# Patient Record
Sex: MALE | Race: WHITE | Employment: FULL TIME | URBAN - METROPOLITAN AREA
[De-identification: names, ages, dates, MRNs, and addresses within clinical notes are randomized per-mention and may not be internally consistent; named-entity substitution may affect disease eponyms.]

---

## 2017-01-04 ENCOUNTER — HOSPITAL ENCOUNTER (OUTPATIENT)
Dept: SURGERY | Age: 62
Discharge: HOME | End: 2017-01-04
Attending: SURGERY | Admitting: SURGERY

## 2017-01-04 VITALS
RESPIRATION RATE: 16 BRPM | HEART RATE: 79 BPM | WEIGHT: 214.95 LBS | BODY MASS INDEX: 29.98 KG/M2 | OXYGEN SATURATION: 97 % | SYSTOLIC BLOOD PRESSURE: 126 MMHG | DIASTOLIC BLOOD PRESSURE: 81 MMHG | TEMPERATURE: 97.9 F

## 2017-01-04 LAB
ANION GAP SERPL CALCULATED.3IONS-SCNC: 10 MMOL/L (ref 3–16)
BUN BLDV-MCNC: 23 MG/DL (ref 7–20)
CALCIUM SERPL-MCNC: 8.8 MG/DL (ref 8.3–10.6)
CHLORIDE BLD-SCNC: 105 MMOL/L (ref 99–110)
CO2: 26 MMOL/L (ref 21–32)
CREAT SERPL-MCNC: 1.1 MG/DL (ref 0.8–1.3)
GFR AFRICAN AMERICAN: >60
GFR NON-AFRICAN AMERICAN: >60
GLUCOSE BLD-MCNC: 92 MG/DL (ref 70–99)
HCT VFR BLD CALC: 43 % (ref 40.5–52.5)
HEMOGLOBIN: 14.5 G/DL (ref 13.5–17.5)
MCH RBC QN AUTO: 30 PG (ref 26–34)
MCHC RBC AUTO-ENTMCNC: 33.7 G/DL (ref 31–36)
MCV RBC AUTO: 89 FL (ref 80–100)
PDW BLD-RTO: 13.5 % (ref 12.4–15.4)
PLATELET # BLD: 178 K/UL (ref 135–450)
PMV BLD AUTO: 8.2 FL (ref 5–10.5)
POTASSIUM SERPL-SCNC: 4.4 MMOL/L (ref 3.5–5.1)
RBC # BLD: 4.84 M/UL (ref 4.2–5.9)
SODIUM BLD-SCNC: 141 MMOL/L (ref 136–145)
WBC # BLD: 6.1 K/UL (ref 4–11)

## 2017-01-04 PROCEDURE — 49650 LAP ING HERNIA REPAIR INIT: CPT | Performed by: SURGERY

## 2017-01-04 RX ORDER — HYDROMORPHONE HCL 110MG/55ML
0.5 PATIENT CONTROLLED ANALGESIA SYRINGE INTRAVENOUS EVERY 5 MIN PRN
Status: DISCONTINUED | OUTPATIENT
Start: 2017-01-04 | End: 2017-01-05 | Stop reason: HOSPADM

## 2017-01-04 RX ORDER — FENTANYL CITRATE 50 UG/ML
25 INJECTION, SOLUTION INTRAMUSCULAR; INTRAVENOUS EVERY 5 MIN PRN
Status: DISCONTINUED | OUTPATIENT
Start: 2017-01-04 | End: 2017-01-05 | Stop reason: HOSPADM

## 2017-01-04 RX ORDER — SODIUM CHLORIDE 9 MG/ML
INJECTION, SOLUTION INTRAVENOUS CONTINUOUS
Status: DISCONTINUED | OUTPATIENT
Start: 2017-01-04 | End: 2017-01-05 | Stop reason: HOSPADM

## 2017-01-04 RX ORDER — OXYCODONE HYDROCHLORIDE AND ACETAMINOPHEN 5; 325 MG/1; MG/1
1-2 TABLET ORAL EVERY 6 HOURS PRN
Qty: 30 TABLET | Refills: 0 | Status: SHIPPED | OUTPATIENT
Start: 2017-01-04 | End: 2017-01-11

## 2017-01-04 RX ORDER — SODIUM CHLORIDE 0.9 % (FLUSH) 0.9 %
10 SYRINGE (ML) INJECTION EVERY 12 HOURS SCHEDULED
Status: DISCONTINUED | OUTPATIENT
Start: 2017-01-04 | End: 2017-01-05 | Stop reason: HOSPADM

## 2017-01-04 RX ORDER — DIPHENHYDRAMINE HYDROCHLORIDE 50 MG/ML
25 INJECTION INTRAMUSCULAR; INTRAVENOUS PRN
Status: DISCONTINUED | OUTPATIENT
Start: 2017-01-04 | End: 2017-01-05 | Stop reason: HOSPADM

## 2017-01-04 RX ORDER — MORPHINE SULFATE 2 MG/ML
1 INJECTION, SOLUTION INTRAMUSCULAR; INTRAVENOUS EVERY 5 MIN PRN
Status: DISCONTINUED | OUTPATIENT
Start: 2017-01-04 | End: 2017-01-05 | Stop reason: HOSPADM

## 2017-01-04 RX ORDER — ONDANSETRON 2 MG/ML
4 INJECTION INTRAMUSCULAR; INTRAVENOUS
Status: ACTIVE | OUTPATIENT
Start: 2017-01-04 | End: 2017-01-04

## 2017-01-04 RX ORDER — SODIUM CHLORIDE 0.9 % (FLUSH) 0.9 %
10 SYRINGE (ML) INJECTION PRN
Status: DISCONTINUED | OUTPATIENT
Start: 2017-01-04 | End: 2017-01-05 | Stop reason: HOSPADM

## 2017-01-04 RX ORDER — CEFAZOLIN SODIUM 2 G/100ML
2 INJECTION, SOLUTION INTRAVENOUS ONCE
Status: COMPLETED | OUTPATIENT
Start: 2017-01-04 | End: 2017-01-04

## 2017-01-04 RX ORDER — LIDOCAINE HYDROCHLORIDE 10 MG/ML
0.5 INJECTION, SOLUTION EPIDURAL; INFILTRATION; INTRACAUDAL; PERINEURAL ONCE
Status: DISCONTINUED | OUTPATIENT
Start: 2017-01-04 | End: 2017-01-05 | Stop reason: HOSPADM

## 2017-01-04 RX ORDER — SODIUM CHLORIDE, SODIUM LACTATE, POTASSIUM CHLORIDE, CALCIUM CHLORIDE 600; 310; 30; 20 MG/100ML; MG/100ML; MG/100ML; MG/100ML
INJECTION, SOLUTION INTRAVENOUS CONTINUOUS
Status: DISCONTINUED | OUTPATIENT
Start: 2017-01-04 | End: 2017-01-05 | Stop reason: HOSPADM

## 2017-01-04 RX ADMIN — SODIUM CHLORIDE, SODIUM LACTATE, POTASSIUM CHLORIDE, CALCIUM CHLORIDE: 600; 310; 30; 20 INJECTION, SOLUTION INTRAVENOUS at 11:55

## 2017-01-04 RX ADMIN — CEFAZOLIN SODIUM 2 G: 2 INJECTION, SOLUTION INTRAVENOUS at 12:29

## 2017-01-04 ASSESSMENT — PAIN - FUNCTIONAL ASSESSMENT: PAIN_FUNCTIONAL_ASSESSMENT: 0-10

## 2017-01-04 ASSESSMENT — PAIN SCALES - GENERAL
PAINLEVEL_OUTOF10: 0

## 2017-01-13 ENCOUNTER — OFFICE VISIT (OUTPATIENT)
Dept: SURGERY | Age: 62
End: 2017-01-13

## 2017-01-13 VITALS — SYSTOLIC BLOOD PRESSURE: 118 MMHG | DIASTOLIC BLOOD PRESSURE: 64 MMHG

## 2017-01-13 DIAGNOSIS — K40.90 LEFT INGUINAL HERNIA: Primary | ICD-10-CM

## 2017-01-13 PROCEDURE — 99024 POSTOP FOLLOW-UP VISIT: CPT | Performed by: SURGERY

## 2017-03-08 ENCOUNTER — TELEPHONE (OUTPATIENT)
Dept: FAMILY MEDICINE CLINIC | Age: 62
End: 2017-03-08

## 2017-03-31 ENCOUNTER — OFFICE VISIT (OUTPATIENT)
Dept: FAMILY MEDICINE CLINIC | Age: 62
End: 2017-03-31

## 2017-03-31 VITALS
DIASTOLIC BLOOD PRESSURE: 67 MMHG | OXYGEN SATURATION: 97 % | BODY MASS INDEX: 31.64 KG/M2 | SYSTOLIC BLOOD PRESSURE: 120 MMHG | HEIGHT: 71 IN | WEIGHT: 226 LBS | HEART RATE: 64 BPM

## 2017-03-31 DIAGNOSIS — K29.50 ANTRAL GASTRITIS: ICD-10-CM

## 2017-03-31 DIAGNOSIS — R74.8 ELEVATED ALKALINE PHOSPHATASE LEVEL: ICD-10-CM

## 2017-03-31 DIAGNOSIS — R13.10 DYSPHAGIA, UNSPECIFIED TYPE: ICD-10-CM

## 2017-03-31 DIAGNOSIS — I10 ESSENTIAL HYPERTENSION: Primary | ICD-10-CM

## 2017-03-31 DIAGNOSIS — E03.9 ACQUIRED HYPOTHYROIDISM: ICD-10-CM

## 2017-03-31 PROCEDURE — 99214 OFFICE O/P EST MOD 30 MIN: CPT | Performed by: FAMILY MEDICINE

## 2017-03-31 PROCEDURE — 3017F COLORECTAL CA SCREEN DOC REV: CPT | Performed by: FAMILY MEDICINE

## 2017-03-31 PROCEDURE — G8427 DOCREV CUR MEDS BY ELIG CLIN: HCPCS | Performed by: FAMILY MEDICINE

## 2017-03-31 PROCEDURE — 1036F TOBACCO NON-USER: CPT | Performed by: FAMILY MEDICINE

## 2017-03-31 PROCEDURE — G8417 CALC BMI ABV UP PARAM F/U: HCPCS | Performed by: FAMILY MEDICINE

## 2017-03-31 PROCEDURE — G8484 FLU IMMUNIZE NO ADMIN: HCPCS | Performed by: FAMILY MEDICINE

## 2017-03-31 RX ORDER — OMEPRAZOLE 20 MG/1
20 CAPSULE, DELAYED RELEASE ORAL DAILY
Qty: 30 CAPSULE | Refills: 3 | Status: SHIPPED | OUTPATIENT
Start: 2017-03-31 | End: 2018-09-01

## 2017-03-31 RX ORDER — ASPIRIN 81 MG/1
81 TABLET, CHEWABLE ORAL DAILY
Qty: 30 TABLET | Refills: 3 | COMMUNITY
Start: 2017-03-31 | End: 2019-11-01 | Stop reason: SDUPTHER

## 2017-03-31 RX ORDER — LEVOTHYROXINE SODIUM 0.2 MG/1
200 TABLET ORAL DAILY
Qty: 90 TABLET | Refills: 3 | Status: SHIPPED | OUTPATIENT
Start: 2017-03-31 | End: 2018-07-06 | Stop reason: SDUPTHER

## 2017-03-31 RX ORDER — VALSARTAN 80 MG/1
80 TABLET ORAL DAILY
Qty: 30 TABLET | Refills: 3 | Status: SHIPPED | OUTPATIENT
Start: 2017-03-31 | End: 2017-04-22

## 2017-03-31 RX ORDER — LISINOPRIL 20 MG/1
20 TABLET ORAL DAILY
Qty: 90 TABLET | Refills: 3 | Status: CANCELLED | OUTPATIENT
Start: 2017-03-31

## 2017-04-22 ENCOUNTER — OFFICE VISIT (OUTPATIENT)
Dept: FAMILY MEDICINE CLINIC | Age: 62
End: 2017-04-22

## 2017-04-22 VITALS
BODY MASS INDEX: 31.72 KG/M2 | SYSTOLIC BLOOD PRESSURE: 122 MMHG | OXYGEN SATURATION: 97 % | WEIGHT: 227.4 LBS | DIASTOLIC BLOOD PRESSURE: 72 MMHG | HEART RATE: 60 BPM

## 2017-04-22 DIAGNOSIS — R13.10 DYSPHAGIA, UNSPECIFIED TYPE: Primary | ICD-10-CM

## 2017-04-22 DIAGNOSIS — I10 ESSENTIAL HYPERTENSION: ICD-10-CM

## 2017-04-22 DIAGNOSIS — J34.89 NASAL DRAINAGE: ICD-10-CM

## 2017-04-22 DIAGNOSIS — L82.1 SEBORRHEIC KERATOSES: ICD-10-CM

## 2017-04-22 PROCEDURE — G8417 CALC BMI ABV UP PARAM F/U: HCPCS | Performed by: FAMILY MEDICINE

## 2017-04-22 PROCEDURE — 3017F COLORECTAL CA SCREEN DOC REV: CPT | Performed by: FAMILY MEDICINE

## 2017-04-22 PROCEDURE — 1036F TOBACCO NON-USER: CPT | Performed by: FAMILY MEDICINE

## 2017-04-22 PROCEDURE — 99213 OFFICE O/P EST LOW 20 MIN: CPT | Performed by: FAMILY MEDICINE

## 2017-04-22 PROCEDURE — G8427 DOCREV CUR MEDS BY ELIG CLIN: HCPCS | Performed by: FAMILY MEDICINE

## 2017-04-22 RX ORDER — LISINOPRIL 20 MG/1
20 TABLET ORAL DAILY
Qty: 90 TABLET | Refills: 3 | Status: SHIPPED | OUTPATIENT
Start: 2017-04-22 | End: 2018-07-06 | Stop reason: SDUPTHER

## 2018-07-06 RX ORDER — LEVOTHYROXINE SODIUM 0.2 MG/1
200 TABLET ORAL DAILY
Qty: 60 TABLET | Refills: 0 | Status: SHIPPED | OUTPATIENT
Start: 2018-07-06 | End: 2018-09-01 | Stop reason: SDUPTHER

## 2018-07-06 RX ORDER — LISINOPRIL 20 MG/1
20 TABLET ORAL DAILY
Qty: 60 TABLET | Refills: 0 | Status: SHIPPED | OUTPATIENT
Start: 2018-07-06 | End: 2018-09-01 | Stop reason: SDUPTHER

## 2018-08-04 ENCOUNTER — TELEPHONE (OUTPATIENT)
Dept: FAMILY MEDICINE CLINIC | Age: 63
End: 2018-08-04

## 2018-08-04 DIAGNOSIS — I10 ESSENTIAL HYPERTENSION: Primary | ICD-10-CM

## 2018-08-04 DIAGNOSIS — Z12.5 SCREENING PSA (PROSTATE SPECIFIC ANTIGEN): ICD-10-CM

## 2018-08-04 DIAGNOSIS — E03.9 ACQUIRED HYPOTHYROIDISM: ICD-10-CM

## 2018-08-04 DIAGNOSIS — Z13.220 SCREENING FOR LIPID DISORDERS: ICD-10-CM

## 2018-09-01 ENCOUNTER — OFFICE VISIT (OUTPATIENT)
Dept: FAMILY MEDICINE CLINIC | Age: 63
End: 2018-09-01

## 2018-09-01 VITALS
SYSTOLIC BLOOD PRESSURE: 126 MMHG | DIASTOLIC BLOOD PRESSURE: 84 MMHG | WEIGHT: 228 LBS | OXYGEN SATURATION: 97 % | BODY MASS INDEX: 32.64 KG/M2 | HEIGHT: 70 IN | HEART RATE: 63 BPM

## 2018-09-01 DIAGNOSIS — K12.1 ULCER MOUTH: ICD-10-CM

## 2018-09-01 DIAGNOSIS — R05.9 COUGH: ICD-10-CM

## 2018-09-01 DIAGNOSIS — E03.9 ACQUIRED HYPOTHYROIDISM: ICD-10-CM

## 2018-09-01 DIAGNOSIS — Z23 NEED FOR VACCINATION: ICD-10-CM

## 2018-09-01 DIAGNOSIS — I10 ESSENTIAL HYPERTENSION: Primary | ICD-10-CM

## 2018-09-01 PROCEDURE — 1101F PT FALLS ASSESS-DOCD LE1/YR: CPT | Performed by: FAMILY MEDICINE

## 2018-09-01 PROCEDURE — 1123F ACP DISCUSS/DSCN MKR DOCD: CPT | Performed by: FAMILY MEDICINE

## 2018-09-01 PROCEDURE — 4040F PNEUMOC VAC/ADMIN/RCVD: CPT | Performed by: FAMILY MEDICINE

## 2018-09-01 PROCEDURE — 99214 OFFICE O/P EST MOD 30 MIN: CPT | Performed by: FAMILY MEDICINE

## 2018-09-01 PROCEDURE — 90670 PCV13 VACCINE IM: CPT | Performed by: FAMILY MEDICINE

## 2018-09-01 PROCEDURE — G8417 CALC BMI ABV UP PARAM F/U: HCPCS | Performed by: FAMILY MEDICINE

## 2018-09-01 PROCEDURE — G8427 DOCREV CUR MEDS BY ELIG CLIN: HCPCS | Performed by: FAMILY MEDICINE

## 2018-09-01 PROCEDURE — 3017F COLORECTAL CA SCREEN DOC REV: CPT | Performed by: FAMILY MEDICINE

## 2018-09-01 PROCEDURE — 90471 IMMUNIZATION ADMIN: CPT | Performed by: FAMILY MEDICINE

## 2018-09-01 PROCEDURE — 1036F TOBACCO NON-USER: CPT | Performed by: FAMILY MEDICINE

## 2018-09-01 RX ORDER — LISINOPRIL 20 MG/1
20 TABLET ORAL DAILY
Qty: 90 TABLET | Refills: 3 | Status: SHIPPED | OUTPATIENT
Start: 2018-09-01 | End: 2019-11-01 | Stop reason: SDUPTHER

## 2018-09-01 RX ORDER — LEVOTHYROXINE SODIUM 0.2 MG/1
200 TABLET ORAL DAILY
Qty: 90 TABLET | Refills: 3 | Status: SHIPPED | OUTPATIENT
Start: 2018-09-01 | End: 2019-11-01 | Stop reason: SDUPTHER

## 2018-09-01 ASSESSMENT — PATIENT HEALTH QUESTIONNAIRE - PHQ9
SUM OF ALL RESPONSES TO PHQ9 QUESTIONS 1 & 2: 0
SUM OF ALL RESPONSES TO PHQ QUESTIONS 1-9: 0
SUM OF ALL RESPONSES TO PHQ QUESTIONS 1-9: 0
1. LITTLE INTEREST OR PLEASURE IN DOING THINGS: 0
2. FEELING DOWN, DEPRESSED OR HOPELESS: 0

## 2018-09-01 NOTE — PROGRESS NOTES
Subjective:      Patient ID: Shad Cat. is a 70 y.o. male. HPI patient presents today for his annual physical.        Here for annual f/u HTN and thyroid. BP running 150-160's at home over past week. Takes lisinopril regularly. States home machine is very old and states he brought in in past and we told him it wasn't accurate. Takes thyroid med reg no SE. Denies dysphagia, states he just gets phelgm in throat. Has had normal EGD in 2016. Tried changing to ARB but didn't help. States sometimes feels like choking on phelgm. Will cough sometimes and will swallow what he gets up. Thinks has tried allergy meds in past but doesn't think they helped. Has been having more sob with activity, mostly going up hills. Hasn't been as active recently and states when can exercise reg doesn't get sob. Has been starting to exercise more recently and states every time starts exercising again feels better and even cough improves. No CP. No dizziness, no swelling, no bowel or bladder issues. Few years ago lost a crown, got a root canal but states \"it's been kind of infected\" since. Has not followed up with anyone since. Sometimes the tooth hurts with eating but overall eating ok. States has a \"blister\" where the bad tooth is. Stopped asa as worried about liver. Patient's medications, allergies, past medical, surgical, social and family histories were reviewed and updated in the EHR as appropriate. Review of Systems    Objective:   Physical Exam    Body mass index is 32.71 kg/m².   Vitals:    09/01/18 0803   BP: 126/84   Site: Left Arm   Position: Sitting   Cuff Size: Large Adult   Pulse: 63   SpO2: 97%   Weight: 228 lb (103.4 kg)   Height: 5' 10\" (1.778 m)     Wt Readings from Last 3 Encounters:   09/01/18 228 lb (103.4 kg)   04/22/17 227 lb 6.4 oz (103.1 kg)   03/31/17 226 lb (102.5 kg)     PHQ score: PHQ-9 Total Score: 0 (9/1/2018  8:04 AM)    GENERAL:Alert and oriented x 4 NAD, obese, well hydrated, well developed. NECK:supple and non tender without mass, no thyromegaly or thyroid nodules, no cervical lymphadenopathy  Left posterior gum along inside at base of last tooth on lower left with ulcerated area. Rest of mucosa ok  LUNG:clear to auscultation bilaterally with normal respiratory effort  CV: Normal heart sounds, regular rate and rhythm without murmurs  EXTREMETY: no loss of hair, trace edema, normal pedal pulses bilaterally          Assessment:      Devan Perez was seen today for annual exam.    Diagnoses and all orders for this visit:    Essential hypertension  -Stable, continue current medications. Get new bp cuff and monitor at home  Reviewed labs with pt  Discussed liver ok, restart ASA 81mg daily    Acquired hypothyroidism  -Stable, continue current medications. Ulcer mouth  See dentist    Cough  Sounds likely from drainage  Monitor    Need for vaccination  -     Pneumococcal conjugate vaccine 13-valent  Declines flu shot  Script for shingles    Other orders  -     lisinopril (PRINIVIL;ZESTRIL) 20 MG tablet; Take 1 tablet by mouth daily  -     levothyroxine (LEVOXYL) 200 MCG tablet; Take 1 tablet by mouth Daily    -     zoster recombinant adjuvanted vaccine (SHINGRIX) 50 MCG SUSR injection; Inject 0.5 mLs into the muscle once for 1 dose Repeat second dose in 2-6 months            Return in about 1 year (around 9/1/2019).

## 2018-09-01 NOTE — PATIENT INSTRUCTIONS
Make appointment with eye doctor  Make appointment with dentist.    Get a new BP machine. Monitor blood pressure 2-3 times a week and drop off readings for review in 1 month.     Goal BP is <140/80 for many people, good control is <130/80

## 2019-09-26 ENCOUNTER — OFFICE VISIT (OUTPATIENT)
Dept: FAMILY MEDICINE CLINIC | Age: 64
End: 2019-09-26
Payer: COMMERCIAL

## 2019-09-26 VITALS
OXYGEN SATURATION: 97 % | BODY MASS INDEX: 32.14 KG/M2 | WEIGHT: 224 LBS | DIASTOLIC BLOOD PRESSURE: 68 MMHG | SYSTOLIC BLOOD PRESSURE: 120 MMHG | HEART RATE: 66 BPM

## 2019-09-26 DIAGNOSIS — I10 ESSENTIAL HYPERTENSION: ICD-10-CM

## 2019-09-26 DIAGNOSIS — M54.50 ACUTE LEFT-SIDED LOW BACK PAIN WITHOUT SCIATICA: Primary | ICD-10-CM

## 2019-09-26 DIAGNOSIS — E03.9 ACQUIRED HYPOTHYROIDISM: ICD-10-CM

## 2019-09-26 DIAGNOSIS — Z12.5 SCREENING PSA (PROSTATE SPECIFIC ANTIGEN): ICD-10-CM

## 2019-09-26 PROCEDURE — 99213 OFFICE O/P EST LOW 20 MIN: CPT | Performed by: FAMILY MEDICINE

## 2019-09-26 PROCEDURE — 3017F COLORECTAL CA SCREEN DOC REV: CPT | Performed by: FAMILY MEDICINE

## 2019-09-26 PROCEDURE — 1123F ACP DISCUSS/DSCN MKR DOCD: CPT | Performed by: FAMILY MEDICINE

## 2019-09-26 PROCEDURE — 1036F TOBACCO NON-USER: CPT | Performed by: FAMILY MEDICINE

## 2019-09-26 PROCEDURE — G8427 DOCREV CUR MEDS BY ELIG CLIN: HCPCS | Performed by: FAMILY MEDICINE

## 2019-09-26 PROCEDURE — G8417 CALC BMI ABV UP PARAM F/U: HCPCS | Performed by: FAMILY MEDICINE

## 2019-09-26 PROCEDURE — 4040F PNEUMOC VAC/ADMIN/RCVD: CPT | Performed by: FAMILY MEDICINE

## 2019-11-01 ENCOUNTER — OFFICE VISIT (OUTPATIENT)
Dept: FAMILY MEDICINE CLINIC | Age: 64
End: 2019-11-01
Payer: COMMERCIAL

## 2019-11-01 VITALS
DIASTOLIC BLOOD PRESSURE: 78 MMHG | HEIGHT: 71 IN | BODY MASS INDEX: 31.22 KG/M2 | OXYGEN SATURATION: 97 % | HEART RATE: 82 BPM | SYSTOLIC BLOOD PRESSURE: 118 MMHG | WEIGHT: 223 LBS

## 2019-11-01 DIAGNOSIS — I10 ESSENTIAL HYPERTENSION: ICD-10-CM

## 2019-11-01 DIAGNOSIS — E03.9 ACQUIRED HYPOTHYROIDISM: ICD-10-CM

## 2019-11-01 DIAGNOSIS — Z23 NEED FOR VACCINATION: ICD-10-CM

## 2019-11-01 DIAGNOSIS — Z00.00 WELL ADULT EXAM: Primary | ICD-10-CM

## 2019-11-01 PROCEDURE — 4040F PNEUMOC VAC/ADMIN/RCVD: CPT | Performed by: FAMILY MEDICINE

## 2019-11-01 PROCEDURE — G8484 FLU IMMUNIZE NO ADMIN: HCPCS | Performed by: FAMILY MEDICINE

## 2019-11-01 PROCEDURE — 3017F COLORECTAL CA SCREEN DOC REV: CPT | Performed by: FAMILY MEDICINE

## 2019-11-01 PROCEDURE — 90471 IMMUNIZATION ADMIN: CPT | Performed by: FAMILY MEDICINE

## 2019-11-01 PROCEDURE — G0438 PPPS, INITIAL VISIT: HCPCS | Performed by: FAMILY MEDICINE

## 2019-11-01 PROCEDURE — 1123F ACP DISCUSS/DSCN MKR DOCD: CPT | Performed by: FAMILY MEDICINE

## 2019-11-01 PROCEDURE — 90732 PPSV23 VACC 2 YRS+ SUBQ/IM: CPT | Performed by: FAMILY MEDICINE

## 2019-11-01 RX ORDER — LISINOPRIL 20 MG/1
20 TABLET ORAL DAILY
Qty: 90 TABLET | Refills: 3 | Status: SHIPPED | OUTPATIENT
Start: 2019-11-01 | End: 2020-10-23 | Stop reason: SDUPTHER

## 2019-11-01 RX ORDER — LEVOTHYROXINE SODIUM 0.2 MG/1
200 TABLET ORAL DAILY
Qty: 90 TABLET | Refills: 3 | Status: SHIPPED | OUTPATIENT
Start: 2019-11-01 | End: 2020-10-23 | Stop reason: SDUPTHER

## 2019-11-01 RX ORDER — ASPIRIN 81 MG/1
81 TABLET, CHEWABLE ORAL DAILY
Qty: 30 TABLET | Refills: 3 | Status: SHIPPED | OUTPATIENT
Start: 2019-11-01 | End: 2020-10-23

## 2019-11-01 ASSESSMENT — VISUAL ACUITY
OD_CC: 20/20
OS_CC: 20/20

## 2019-11-01 ASSESSMENT — PATIENT HEALTH QUESTIONNAIRE - PHQ9
SUM OF ALL RESPONSES TO PHQ QUESTIONS 1-9: 0
SUM OF ALL RESPONSES TO PHQ QUESTIONS 1-9: 0

## 2020-10-02 ENCOUNTER — TELEPHONE (OUTPATIENT)
Dept: FAMILY MEDICINE CLINIC | Age: 65
End: 2020-10-02

## 2020-10-02 NOTE — TELEPHONE ENCOUNTER
Patient would like to know if you are able to mail his lab orders to him. He is not able to pick them up during the week.     Please give him a call back

## 2020-10-23 ENCOUNTER — OFFICE VISIT (OUTPATIENT)
Dept: FAMILY MEDICINE CLINIC | Age: 65
End: 2020-10-23
Payer: COMMERCIAL

## 2020-10-23 VITALS
DIASTOLIC BLOOD PRESSURE: 78 MMHG | HEART RATE: 65 BPM | BODY MASS INDEX: 31.36 KG/M2 | HEIGHT: 71 IN | SYSTOLIC BLOOD PRESSURE: 120 MMHG | OXYGEN SATURATION: 99 % | TEMPERATURE: 98.5 F | WEIGHT: 224 LBS

## 2020-10-23 PROCEDURE — 99397 PER PM REEVAL EST PAT 65+ YR: CPT | Performed by: FAMILY MEDICINE

## 2020-10-23 PROCEDURE — G8484 FLU IMMUNIZE NO ADMIN: HCPCS | Performed by: FAMILY MEDICINE

## 2020-10-23 RX ORDER — LEVOTHYROXINE SODIUM 0.2 MG/1
200 TABLET ORAL DAILY
Qty: 90 TABLET | Refills: 3 | Status: SHIPPED | OUTPATIENT
Start: 2020-10-23 | End: 2021-12-02 | Stop reason: SDUPTHER

## 2020-10-23 RX ORDER — LISINOPRIL 20 MG/1
20 TABLET ORAL DAILY
Qty: 90 TABLET | Refills: 3 | Status: SHIPPED | OUTPATIENT
Start: 2020-10-23 | End: 2021-12-02 | Stop reason: SDUPTHER

## 2020-10-23 ASSESSMENT — PATIENT HEALTH QUESTIONNAIRE - PHQ9
2. FEELING DOWN, DEPRESSED OR HOPELESS: 0
1. LITTLE INTEREST OR PLEASURE IN DOING THINGS: 0
SUM OF ALL RESPONSES TO PHQ QUESTIONS 1-9: 0
SUM OF ALL RESPONSES TO PHQ9 QUESTIONS 1 & 2: 0

## 2020-10-23 NOTE — PATIENT INSTRUCTIONS
Patient Education        Well Visit, Over 72: Care Instructions  Your Care Instructions     Physical exams can help you stay healthy. Your doctor has checked your overall health and may have suggested ways to take good care of yourself. He or she also may have recommended tests. At home, you can help prevent illness with healthy eating, regular exercise, and other steps. Follow-up care is a key part of your treatment and safety. Be sure to make and go to all appointments, and call your doctor if you are having problems. It's also a good idea to know your test results and keep a list of the medicines you take. How can you care for yourself at home? · Reach and stay at a healthy weight. This will lower your risk for many problems, such as obesity, diabetes, heart disease, and high blood pressure. · Get at least 30 minutes of exercise on most days of the week. Walking is a good choice. You also may want to do other activities, such as running, swimming, cycling, or playing tennis or team sports. · Do not smoke. Smoking can make health problems worse. If you need help quitting, talk to your doctor about stop-smoking programs and medicines. These can increase your chances of quitting for good. · Protect your skin from too much sun. When you're outdoors from 10 a.m. to 4 p.m., stay in the shade or cover up with clothing and a hat with a wide brim. Wear sunglasses that block UV rays. Even when it's cloudy, put broad-spectrum sunscreen (SPF 30 or higher) on any exposed skin. · See a dentist one or two times a year for checkups and to have your teeth cleaned. · Wear a seat belt in the car. Follow your doctor's advice about when to have certain tests. These tests can spot problems early. For men and women  · Cholesterol. Your doctor will tell you how often to have this done based on your overall health and other things that can increase your risk for heart attack and stroke. · Blood pressure.  Have your blood pressure checked during a routine doctor visit. Your doctor will tell you how often to check your blood pressure based on your age, your blood pressure results, and other factors. · Diabetes. Ask your doctor whether you should have tests for diabetes. · Vision. Experts recommend that you have yearly exams for glaucoma and other age-related eye problems. · Hearing. Tell your doctor if you notice any change in your hearing. You can have tests to find out how well you hear. · Colon cancer tests. Keep having colon cancer tests as your doctor recommends. You can have one of several types of tests. · Heart attack and stroke risk. At least every 4 to 6 years, you should have your risk for heart attack and stroke assessed. Your doctor uses factors such as your age, blood pressure, cholesterol, and whether you smoke or have diabetes to show what your risk for a heart attack or stroke is over the next 10 years. · Osteoporosis. Talk to your doctor about whether you should have a bone density test to find out whether you have thinning bones. Ask your doctor if you need to take a calcium plus vitamin D supplement. You may be able to get enough calcium and vitamin D through your diet. For women  · Pap test and pelvic exam. You may no longer need a Pap test. Talk with your doctor about whether to stop or continue to have Pap tests. · Breast exam and mammogram. Ask how often you should have a mammogram, which is an X-ray of your breasts. A mammogram can spot breast cancer before it can be felt and when it is easiest to treat. · Thyroid disease. Talk to your doctor about whether to have your thyroid checked as part of a regular physical exam. Women have an increased chance of a thyroid problem. For men  · Prostate exam. Talk to your doctor about whether you should have a blood test (called a PSA test) for prostate cancer.  Experts recommend that you discuss the benefits and risks of the test with your doctor before you decide whether to have this test. Some experts say that men ages 79 and older no longer need testing. · Abdominal aortic aneurysm. Ask your doctor whether you should have a test to check for an aneurysm. You may need a test if you ever smoked or if your parent, brother, sister, or child has had an aneurysm. When should you call for help? Watch closely for changes in your health, and be sure to contact your doctor if you have any problems or symptoms that concern you. Where can you learn more? Go to [email or web address] and sign in to your [employer] account. Enter S442 in the [employer] box to learn more about \"Well Visit, Over 65: Care Instructions. \"     If you do not have an account, please click on the \"Sign Up Now\" link. Current as of: May 27, 2020               Content Version: 12.6  © 6449-8587 [employer]. Care instructions adapted under license by [facility] ([facility]). If you have questions about a medical condition or this instruction, always ask your healthcare professional. [address] any warranty or liability for your use of this information. Exercise and Seniors  Is it safe for me to exercise? It is safe for most adults older than 72years of age to exercise. Even patients who have chronic illnesses such as heart disease, high blood pressure, diabetes and arthritis can exercise safely. Many of these conditions are improved with exercise. If you are not sure if exercise is safe for you or if you are currently inactive, ask your doctor. How do I get started? It is important to wear loose, comfortable clothing and well-fitting, sturdy shoes. Your shoes should have a good arch support, and an elevated and cushioned heel to absorb shock. If you are not already active, begin slowly. Start with exercises that you are already comfortable doing. Starting slowly makes it less likely that you will injure yourself.  Starting slowly also helps prevent soreness. The saying no pain, no gain is not true for older or elderly adults. You do not have to exercise at a high intensity to get most health benefits. For example, walking is an excellent activity to start with. As you become used to exercising, or if you are already active, you can slowly increase the intensity of your exercise program.    What type of exercise should I do? There are several types of exercise that you should do. You will want to do some type of aerobic activity for at least 30 minutes on most days of the week. Examples are walking, swimming and bicycling. You should also do resistance (also called strength training) 2 days per week. Warm up for 5 minutes before each exercise session. Walking slowly and then stretching are good warm-up activities. You should also cool down with more stretching for 5 minutes when you finish exercising. Cool down longer in warmer weather. Exercise is only good for you if you are feeling well. Wait to exercise until you feel better if you have a cold, the flu or another illness. If you miss exercise for more  than 2 weeks, be sure to start slowly again. When should I call my doctor? If your muscles or joints are sore the day after exercising, you may have done too much. Next time, exercise at a lower intensity. If the pain or discomfort persists, you should talk to your doctor. You should also talk to your doctor if you have any of the following symptoms while exercising:  -Chest pain or pressure  -Trouble breathing or excessive shortness of breath  -lightheadedness or dizziness  -difficulty with balance  -nausea    What are some specific exercises I can do? The following shows some simple strength exercises that you can do at home. Each exercise should be done 8 to 10 times for 2 sets.   Remember to:   -Complete all movements in a slow, controlled fashion  -Dont hold your breath  -Stop if you feel pain   -Stretch each muscle after your workout. Wall Pushups  Place hands flat against the wall. Slowly lower body to the wall. Push body away from wall to return to starting position. Chair Squats  Begin by sitting in the chair. Lean slightly forward and stand up from the chair. Try not to favor one side or use your hands to help you. Bicep Curls  Hold a weight in each hand with your arms at your sides. Bending your arms at the elbows, lift the weights to your shoulders and then lower them to your sides. Shoulder Shrugs  Hold a weight in each hand with your arms at your side. Shrug your shoulders up toward your ears and then lower them back down. Citations  Promoting and Prescribing Exercise for the Elderly by Arianna Gonzalez M.D., and Erika Pineda, Ph.D.(02/01/02, http://www. aafp.org/afp/26944095/419.html)    Last Updated: January 2011  This article was contributed by: familydoctor. org editorial staff  Copyright © American Academy of [employer]icians  This information provides a general overview and may not apply to everyone. Talk to your family doctor to find out if this information applies to you and to get more information on this subject. Please bring in a copy of your living will and healthcare power of  to put in your chart. Advance Directives, DNR, and MOLST    Decision making at the end of life is difficult for patients, families and health care providers. Since the early 1990s, a number of forms have been developed to help people express their wishes in advance. What are \"advance directives\"? Advance directives are documents that can help you remain in charge of your health care even after you can no longer make decisions for yourself. The two most common forms of written advance directives are the living will and durable power of  for healthcare. Some people seek an s services to complete these documents; however this is not required.  You can complete these documents yourself and have them either and/or electric shock. What is a DNR -Comfort Care form (a.k.a. [facility])? DNR means do not resuscitate. A DNR is a medical order given by a physician or other legally authorized prescriber. It addresses the various methods used to revive people whose hearts have stopped beating /or who have stopped breathing. If a person has a [facility] order, he will receive care that eases pain and suffering but no cardio-pulmonary resuscitation (CPR) to save or prolong life. The [facility] becomes active as soon as it is signed by the doctor or advanced practice nurse. The [facility] is a standard form which can be obtained from the [address] [facility] or healthcare facilities. What is DNR Comfort Care - Arrest (a.k.a. [address])? The [address] is similar to the [facility] but it only becomes active if and when the person has a cardiac and/or respiratory arrest (i.e. the person stops breathing or his heart stops beating). The [address] is a standard form which can be obtained from the [address] [facility] or healthcare facilities. What is a MOLST? MOLST stands for Medical Orders for Life Sustaining Treatment. Erminio Prima is a medical order which specifies different treatment options for individuals who are seriously ill or frail and elderly. The MOLST allows patients or their surrogate to discuss care options they do and do not want to receive at the end of life, and then have their physician or other prescriber convey them into orders. This form would be available through [address] and healthcare facilities. Patient Education        Preventing Falls: Care Instructions  Your Care Instructions     Getting around your home safely can be a challenge if you have injuries or health problems that make it easy for you to fall. Loose rugs and furniture in walkways are among the dangers for many older people who have problems walking or who have poor eyesight.  People who have especially on ceramic tile floors. · If you use a walker or cane, put rubber tips on it. If you use crutches, clean the bottoms of them regularly with an abrasive pad, such as steel wool. · Keep your house well lit, especially Farhan Konig, and outside walkways. Use night-lights in areas such as hallways and bathrooms. Add extra light switches or use remote switches (such as switches that go on or off when you clap your hands) to make it easier to turn lights on if you have to get up during the night. · Install sturdy handrails on stairways. · Move items in your cabinets so that the things you use a lot are on the lower shelves (about waist level). · Keep a cordless phone and a flashlight with new batteries by your bed. If possible, put a phone in each of the main rooms of your house, or carry a cell phone in case you fall and cannot reach a phone. Or, you can wear a device around your neck or wrist. You push a button that sends a signal for help. · Wear low-heeled shoes that fit well and give your feet good support. Use footwear with nonskid soles. Check the heels and soles of your shoes for wear. Repair or replace worn heels or soles. · Do not wear socks without shoes on wood floors. · Walk on the grass when the sidewalks are slippery. If you live in an area that gets snow and ice in the winter, sprinkle salt on slippery steps and sidewalks. Preventing falls in the bath  · Install grab bars and nonskid mats inside and outside your shower or tub and near the toilet and sinks. · Use shower chairs and bath benches. · Use a hand-held shower head that will allow you to sit while showering. · Get into a tub or shower by putting the weaker leg in first. Get out of a tub or shower with your strong side first.  · Repair loose toilet seats and consider installing a raised toilet seat to make getting on and off the toilet easier. · Keep your bathroom door unlocked while you are in the shower.   Where can older get vaccinated every flu season. Children 6 months through 6years of age may need 2 doses during a single flu season. Everyone else needs only 1 dose each flu season. It takes about 2 weeks for protection to develop after vaccination. There are many flu viruses, and they are always changing. Each year a new flu vaccine is made to protect against three or four viruses that are likely to cause disease in the upcoming flu season. Even when the vaccine doesn't exactly match these viruses, it may still provide some protection. Influenza vaccine does not cause flu. Influenza vaccine may be given at the same time as other vaccines. Talk with your health care provider  Tell your vaccine provider if the person getting the vaccine:  · Has had an allergic reaction after a previous dose of influenza vaccine, or has any severe, life-threatening allergies. · Has ever had Guillain-Barré Syndrome (also called GBS). In some cases, your health care provider may decide to postpone influenza vaccination to a future visit. People with minor illnesses, such as a cold, may be vaccinated. People who are moderately or severely ill should usually wait until they recover before getting influenza vaccine. Your health care provider can give you more information. Risks of a vaccine reaction  · Soreness, redness, and swelling where shot is given, fever, muscle aches, and headache can happen after influenza vaccine. · There may be a very small increased risk of Guillain-Barré Syndrome (GBS) after inactivated influenza vaccine (the flu shot). [facility] children who get the flu shot along with pneumococcal vaccine (PCV13), and/or DTaP vaccine at the same time might be slightly more likely to have a seizure caused by fever. Tell your health care provider if a child who is getting flu vaccine has ever had a seizure. People sometimes faint after medical procedures, including vaccination.  Tell your provider if you feel dizzy or have [email or web address].org/vis. Care instructions adapted under license by [facility] ([facility]). If you have questions about a medical condition or this instruction, always ask your healthcare professional. [address] any warranty or liability for your use of this information.

## 2020-10-23 NOTE — PROGRESS NOTES
Here for annual physical.    Dental: up-to-date  Eye: up-to-date    Colonoscopy: up-to-date    Exercise: not really, active at work, [employer]   Diet: ok    Checks BP at home: Yes  BP numbers: 110/60's  Taking medication daily: Yes  Side Effects of medication: no    Taking thyroid meds reg, no SE. No CP, no palpitations, no sob, no edema, no cough, no change in bowel or bladder      HM reviewed with pt    Patient's medications, allergies, past medical, surgical, social and family histories were reviewed and updated in the EHR as appropriate. Body mass index is 31.24 kg/m². Vitals:    10/23/20 0829   BP: 120/78   Site: Left Upper Arm   Position: Sitting   Cuff Size: Medium Adult   Pulse: 65   Temp: 98.5 °F (36.9 °C)   TempSrc: Tympanic   SpO2: 99%   Weight: 224 lb (101.6 kg)   Height: 5' 11\" (1.803 m)     Wt Readings from Last 3 Encounters:   10/23/20 224 lb (101.6 kg)   11/01/19 223 lb (101.2 kg)   09/26/19 224 lb (101.6 kg)     PHQ score: PHQ-9 Total Score: 0 (10/23/2020  8:29 AM)      GENERAL:Alert and oriented x 4 NAD, affect appropriate and obese, well hydrated, well developed. [place]  NECK:supple and non tender without mass, no thyromegaly or thyroid nodules, no cervical lymphadenopathy  LUNG:clear to auscultation bilaterally with normal respiratory effort  CV: Normal heart sounds, regular rate and rhythm without murmurs  EXTREMETY: no loss of hair, no edema, normal pedal pulses bilaterally          ASSESSMENT AND PLAN:       Twylla Lipoma was seen today for annual exam.    Diagnoses and all orders for this visit:    Well adult exam  Recommended screenings discussed and ordered if patient agreed  Recommended vaccinations discussed and ordered if patient agreed  Encouraged healthy diet   Encouraged regular exercise and maintaining a healthy weight    Essential hypertension  -Stable, continue current medications. Reviewed recent labs with patient.      Acquired hypothyroidism  -Stable, continue current

## 2021-01-29 ENCOUNTER — TELEPHONE (OUTPATIENT)
Dept: FAMILY MEDICINE CLINIC | Age: 66
End: 2021-01-29

## 2021-01-29 ENCOUNTER — OFFICE VISIT (OUTPATIENT)
Dept: FAMILY MEDICINE CLINIC | Age: 66
End: 2021-01-29
Payer: COMMERCIAL

## 2021-01-29 VITALS
BODY MASS INDEX: 31.69 KG/M2 | WEIGHT: 227.2 LBS | DIASTOLIC BLOOD PRESSURE: 80 MMHG | TEMPERATURE: 97.3 F | HEART RATE: 63 BPM | SYSTOLIC BLOOD PRESSURE: 130 MMHG | OXYGEN SATURATION: 98 %

## 2021-01-29 DIAGNOSIS — G89.29 CHRONIC BILATERAL LOW BACK PAIN WITHOUT SCIATICA: Primary | ICD-10-CM

## 2021-01-29 DIAGNOSIS — M54.50 CHRONIC BILATERAL LOW BACK PAIN WITHOUT SCIATICA: Primary | ICD-10-CM

## 2021-01-29 PROCEDURE — 3017F COLORECTAL CA SCREEN DOC REV: CPT | Performed by: FAMILY MEDICINE

## 2021-01-29 PROCEDURE — 99213 OFFICE O/P EST LOW 20 MIN: CPT | Performed by: FAMILY MEDICINE

## 2021-01-29 PROCEDURE — 1036F TOBACCO NON-USER: CPT | Performed by: FAMILY MEDICINE

## 2021-01-29 PROCEDURE — G8484 FLU IMMUNIZE NO ADMIN: HCPCS | Performed by: FAMILY MEDICINE

## 2021-01-29 PROCEDURE — 1123F ACP DISCUSS/DSCN MKR DOCD: CPT | Performed by: FAMILY MEDICINE

## 2021-01-29 PROCEDURE — 4040F PNEUMOC VAC/ADMIN/RCVD: CPT | Performed by: FAMILY MEDICINE

## 2021-01-29 PROCEDURE — G8417 CALC BMI ABV UP PARAM F/U: HCPCS | Performed by: FAMILY MEDICINE

## 2021-01-29 PROCEDURE — G8427 DOCREV CUR MEDS BY ELIG CLIN: HCPCS | Performed by: FAMILY MEDICINE

## 2021-01-29 ASSESSMENT — PATIENT HEALTH QUESTIONNAIRE - PHQ9
SUM OF ALL RESPONSES TO PHQ9 QUESTIONS 1 & 2: 0
SUM OF ALL RESPONSES TO PHQ QUESTIONS 1-9: 0
2. FEELING DOWN, DEPRESSED OR HOPELESS: 0
SUM OF ALL RESPONSES TO PHQ QUESTIONS 1-9: 0

## 2021-01-29 NOTE — TELEPHONE ENCOUNTER
Genna Valdivia,    This patient needs a colonoscopy but has no family or friends in area to take him. Do you know if any of the transport services will do this - particularly take home after he has sedation. Thanks!     RK

## 2021-01-29 NOTE — TELEPHONE ENCOUNTER
Dr. Vargas Corporal, he can use an uber for a ride to the hospital.  He will have to get someone to pick him up from the hospital and sign responsibility.   Gautam Jackson

## 2021-01-29 NOTE — PATIENT INSTRUCTIONS
[address]  [place]   [address], [address]  Toll-Free:  [phone]   Fax: [phone]   E-mail: :[email or web address]      Patient Education        Back Stretches: Exercises  Introduction  Here are some examples of exercises for stretching your back. Start each exercise slowly. Ease off the exercise if you start to have pain. Your doctor or physical therapist will tell you when you can start these exercises and which ones will work best for you. How to do the exercises  Overhead stretch   1. Stand comfortably with your feet shoulder-width apart. 2. Looking straight ahead, raise both arms over your head and reach toward the ceiling. Do not allow your head to tilt back. 3. Hold for 15 to 30 seconds, then lower your arms to your sides. 4. Repeat 2 to 4 times. Side stretch   1. Stand comfortably with your feet shoulder-width apart. 2. Raise one arm over your head, and then lean to the other side. 3. Slide your hand down your leg as you let the weight of your arm gently stretch your side muscles. Hold for 15 to 30 seconds. 4. Repeat 2 to 4 times on each side. Press-up   1. Lie on your stomach, supporting your body with your forearms. 2. Press your elbows down into the floor to raise your upper back. As you do this, relax your stomach muscles and allow your back to arch without using your back muscles. As your press up, do not let your hips or pelvis come off the floor. 3. Hold for 15 to 30 seconds, then relax. 4. Repeat 2 to 4 times. Relax and rest   1. Lie on your back with a rolled towel under your neck and a pillow under your knees. Extend your arms comfortably to your sides. 2. Relax and breathe normally. 3. Remain in this position for about 10 minutes. 4. If you can, do this 2 or 3 times each day. Follow-up care is a key part of your treatment and safety. Be sure to make and go to all appointments, and call your doctor if you are having problems. It's also a good idea to know your test results and keep a list of the medicines you take. Where can you learn more? Go to [email or web address] and sign in to your Urget account. Enter P132 in the Theravance box to learn more about \"Back Stretches: Exercises. \"     If you do not have an account, please click on the \"Sign Up Now\" link. Current as of: March 2, 2020               Content Version: 12.6  © 7807-9202 [employer]. Care instructions adapted under license by [facility] ([facility]). If you have questions about a medical condition or this instruction, always ask your healthcare professional. [address] any warranty or liability for your use of this information.

## 2021-01-29 NOTE — PROGRESS NOTES
Patient is here complaining of back pain that started 3 year(s) ago. States doesn't feel like a back ache or muscle ache just feels like \"uncomfortable something\" that isn't all the time but is getting worse over past 3 years. Usually on left but sometimes on right, left is worse. Feels like it is \"inside\". Comes and goes. Doesn't interfere with activity. Nothing seems to make it better or worse. States when leans sideways can feel it. Not currently having any sx. Initial inciting event: none   Pain is described as: aching  Pain down legs: No  Numbness or Tingling: No  Worse with: none  Better with: none  Bowel changes: No  Bladder changes: No    Patient has tried nothing to help with pain with no. Pain is worse. Allergies   Allergen Reactions    Clindamycin/Lincomycin     Clindamycin/Lincomycin Hives             Vitals:    01/29/21 0859   BP: 130/80   Site: Left Upper Arm   Position: Sitting   Cuff Size: Medium Adult   Pulse: 63   Temp: 97.3 °F (36.3 °C)   TempSrc: Temporal   SpO2: 98%   Weight: 227 lb 3.2 oz (103.1 kg)     Wt Readings from Last 3 Encounters:   01/29/21 227 lb 3.2 oz (103.1 kg)   10/23/20 224 lb (101.6 kg)   11/01/19 223 lb (101.2 kg)     Body mass index is 31.69 kg/m². PHQ-9 Total Score: 0 (1/29/2021  8:59 AM)      GEN: Alert and oriented x 4 NAD, affect appropriate and obese, well hydrated, well developed. lumbar spine  Noted elevation right scapula and curvature of spine with flexion    Flexion: normal without pain. Extension: normal without pain. Rotation:  normal without pain. Lateral Bending:  normal without pain. Palpation: spinous processes are non-tender on palpation, paraspinous muscles are non-tender on palpation    Strength 5/5 lower extremity bilaterally without pain. ASSESSMENT AND PLAN:       Marin Martines was seen today for back pain.     Diagnoses and all orders for this visit:    Chronic bilateral low back pain without sciatica -     XR LUMBAR SPINE (2-3 VIEWS); Future    dif to tell what he is really feeling, seems very concerned about cancer given his father  of prostate cancer and back pain was his sx discussed with pt psa was normal last fall. Due for colonoscopy, but doesn't have transportation. Will message Care Coordinator for assistance. Encouraged pt to call [facility] to see if they will do. Discussed with pt sx likely m/s chris given looks like has scoliosis. Stretches given, check XR, discussed sx tx as needed.

## 2021-11-13 ENCOUNTER — HOSPITAL ENCOUNTER (OUTPATIENT)
Age: 66
Discharge: HOME | End: 2021-11-13
Payer: COMMERCIAL

## 2021-11-13 ENCOUNTER — HOSPITAL ENCOUNTER (OUTPATIENT)
Dept: GENERAL RADIOLOGY | Age: 66
Discharge: HOME | End: 2021-11-13
Payer: COMMERCIAL

## 2021-11-13 DIAGNOSIS — M54.50 CHRONIC BILATERAL LOW BACK PAIN WITHOUT SCIATICA: ICD-10-CM

## 2021-11-13 DIAGNOSIS — G89.29 CHRONIC BILATERAL LOW BACK PAIN WITHOUT SCIATICA: ICD-10-CM

## 2021-11-13 PROCEDURE — 72100 X-RAY EXAM L-S SPINE 2/3 VWS: CPT

## 2021-11-16 DIAGNOSIS — E03.9 ACQUIRED HYPOTHYROIDISM: ICD-10-CM

## 2021-11-16 DIAGNOSIS — Z12.5 SCREENING PSA (PROSTATE SPECIFIC ANTIGEN): ICD-10-CM

## 2021-11-16 DIAGNOSIS — I10 ESSENTIAL HYPERTENSION: ICD-10-CM

## 2021-11-16 DIAGNOSIS — Z00.00 WELL ADULT EXAM: Primary | ICD-10-CM

## 2021-12-02 ENCOUNTER — OFFICE VISIT (OUTPATIENT)
Dept: FAMILY MEDICINE CLINIC | Age: 66
End: 2021-12-02
Payer: COMMERCIAL

## 2021-12-02 VITALS
SYSTOLIC BLOOD PRESSURE: 130 MMHG | WEIGHT: 224.8 LBS | BODY MASS INDEX: 31.35 KG/M2 | TEMPERATURE: 97.3 F | HEART RATE: 67 BPM | DIASTOLIC BLOOD PRESSURE: 78 MMHG | OXYGEN SATURATION: 98 %

## 2021-12-02 DIAGNOSIS — Z00.00 WELL ADULT EXAM: Primary | ICD-10-CM

## 2021-12-02 DIAGNOSIS — M51.36 DDD (DEGENERATIVE DISC DISEASE), LUMBAR: ICD-10-CM

## 2021-12-02 DIAGNOSIS — E03.9 ACQUIRED HYPOTHYROIDISM: ICD-10-CM

## 2021-12-02 DIAGNOSIS — I10 ESSENTIAL HYPERTENSION: ICD-10-CM

## 2021-12-02 PROBLEM — M51.369 DDD (DEGENERATIVE DISC DISEASE), LUMBAR: Status: ACTIVE | Noted: 2021-12-02

## 2021-12-02 PROCEDURE — G8484 FLU IMMUNIZE NO ADMIN: HCPCS | Performed by: FAMILY MEDICINE

## 2021-12-02 PROCEDURE — 99397 PER PM REEVAL EST PAT 65+ YR: CPT | Performed by: FAMILY MEDICINE

## 2021-12-02 RX ORDER — LISINOPRIL 20 MG/1
20 TABLET ORAL DAILY
Qty: 90 TABLET | Refills: 3 | Status: SHIPPED | OUTPATIENT
Start: 2021-12-02

## 2021-12-02 RX ORDER — LEVOTHYROXINE SODIUM 0.2 MG/1
200 TABLET ORAL DAILY
Qty: 90 TABLET | Refills: 3 | Status: SHIPPED | OUTPATIENT
Start: 2021-12-02

## 2021-12-02 NOTE — PROGRESS NOTES
Here for annual physical.  No concerns    Dental: up-to-date  Eye: up-to-date    Colonoscopy: up-to-date    Exercise: treadmill and rowing machine  Diet: 2 meals daily with diet being general healthy    Living Will: No,   Additional information provided    PHQ Scores 1/29/2021 10/23/2020 11/1/2019 9/1/2018   PHQ2 Score 0 0 0 0   PHQ9 Score 0 0 0 0     States back pain isn't too bad. Doesn't take meds for it. States not a big issue right now. Declines flu and covid vaccine. Discussed high risk for both due to age and weight. Sometimes checks BP, states ok when checks. Taking lisinopril dialy, no SE. HM reviewed with pt    Patient's medications, allergies, past medical, surgical, social and family histories were reviewed and updated in the EHR as appropriate. Vitals:    12/02/21 1601   BP: 130/78   Site: Left Upper Arm   Position: Sitting   Cuff Size: Large Adult   Pulse: 67   Temp: 97.3 °F (36.3 °C)   TempSrc: Infrared   SpO2: 98%   Weight: 224 lb 12.8 oz (102 kg)     Wt Readings from Last 3 Encounters:   12/02/21 224 lb 12.8 oz (102 kg)   01/29/21 227 lb 3.2 oz (103.1 kg)   10/23/20 224 lb (101.6 kg)     Body mass index is 31.35 kg/m². GENERAL Alert and oriented x 4 NAD, affect appropriate and obese, well hydrated, well developed.   NECK:supple and non tender without mass, no thyromegaly or thyroid nodules, no cervical lymphadenopathy  LUNG:clear to auscultation bilaterally with normal respiratory effort  CV: Normal heart sounds, regular rate and rhythm without murmurs  EXTREMETY: no loss of hair, no edema, normal pedal pulses bilaterally  NEURO: CN grossly intact, moving all extremities equally, no gross deficits          ASSESSMENT AND PLAN:       Lyla was seen today for annual exam.    Diagnoses and all orders for this visit:    Well adult exam  Recommended screenings discussed and ordered if patient agreed  Recommended vaccinations discussed and ordered if patient agreed  Encouraged healthy diet   Encouraged regular exercise and maintaining a healthy weight  Discussed living will/healthcare POA and encouraged to get if doesn't have. Declines vaccination    Essential hypertension  -Stable, continue current medications. Reviewed pre-visit labs with patient. (CMP and PSA) Review of these labs contributed to MDM. Acquired hypothyroidism  -Stable, continue current medications. Reviewed pre-visit labs with patient. (TSH) Review of these labs contributed to MDM. DDD (degenerative disc disease), lumbar  Exercises given   Monitor            Return in about 1 year (around 12/2/2022) for Well, 30 min.          Portions of Note per  Debbi Pepe CMA AAMA with corrections and edits per Vicki Henning MD.  I agree with entirety of note and was present and performed history and physical.  I also confirm that the note above accurately reflects all work, treatment, procedures, and medical decision making performed by me, Vicki Henning MD

## 2021-12-02 NOTE — PATIENT INSTRUCTIONS
sports. · Reach and stay at a healthy weight. This will lower your risk for many problems, such as obesity, diabetes, heart disease, and high blood pressure. · Do not smoke. Smoking can make health problems worse. If you need help quitting, talk to your doctor about stop-smoking programs and medicines. These can increase your chances of quitting for good. · Care for your mental health. It is easy to get weighed down by worry and stress. Learn strategies to manage stress, like deep breathing and mindfulness, and stay connected with your family and community. If you find you often feel sad or hopeless, talk with your doctor. Treatment can help. · Talk to your doctor about whether you have any risk factors for sexually transmitted infections (STIs). You can help prevent STIs if you wait to have sex with a new partner (or partners) until you've each been tested for STIs. It also helps if you use condoms (male or female condoms) and if you limit your sex partners to one person who only has sex with you. Vaccines are available for some STIs. · If you think you may have a problem with alcohol or drug use, talk to your doctor. This includes prescription medicines (such as amphetamines and opioids) and illegal drugs (such as cocaine and methamphetamine). Your doctor can help you figure out what type of treatment is best for you. · Protect your skin from too much sun. When you're outdoors from 10 a.m. to 4 p.m., stay in the shade or cover up with clothing and a hat with a wide brim. Wear sunglasses that block UV rays. Even when it's cloudy, put broad-spectrum sunscreen (SPF 30 or higher) on any exposed skin. · See a dentist one or two times a year for checkups and to have your teeth cleaned. · Wear a seat belt in the car. When should you call for help? Watch closely for changes in your health, and be sure to contact your doctor if you have any problems or symptoms that concern you. Where can you learn more?   Go to [email or web address] and sign in to your BitRock account. Enter D218 in the [facility] box to learn more about \"Well Visit, Over 65: Care Instructions. \"     If you do not have an account, please click on the \"Sign Up Now\" link. Current as of: February 11, 2021               Content Version: 13.0  © 9314-0769 Healthwise, Incorporated. Care instructions adapted under license by [facility] ([facility]). If you have questions about a medical condition or this instruction, always ask your healthcare professional. [place] [address] any warranty or liability for your use of this information. Covid Vaccine        Schedule through your local pharmacy   We are not sure when we will have the vaccine available in our office. The vaccines for COVID have been some of the most tested and monitored vaccines ever made. Dr. Matthew Escamilla strongly encourages all her patients 5 years and older to get vaccinated. After you are vaccinated please consider registering for the V-Safe program. This is program has been set up to track vaccine side effects and complications and help add to the data we have about the vaccines. vsafe.cdc.gov    Patient Education        Back Stretches: Exercises  Introduction  Here are some examples of exercises for stretching your back. Start each exercise slowly. Ease off the exercise if you start to have pain. Your doctor or physical therapist will tell you when you can start these exercises and which ones will work best for you. How to do the exercises  Overhead stretch    1. Stand comfortably with your feet shoulder-width apart. 2. Looking straight ahead, raise both arms over your head and reach toward the ceiling. Do not allow your head to tilt back. 3. Hold for 15 to 30 seconds, then lower your arms to your sides. 4. Repeat 2 to 4 times. Side stretch    1. Stand comfortably with your feet shoulder-width apart.   2. Raise one arm over your head, and then lean to the other side. 3. Slide your hand down your leg as you let the weight of your arm gently stretch your side muscles. Hold for 15 to 30 seconds. 4. Repeat 2 to 4 times on each side. Press-up    1. Lie on your stomach, supporting your body with your forearms. 2. Press your elbows down into the floor to raise your upper back. As you do this, relax your stomach muscles and allow your back to arch without using your back muscles. As your press up, do not let your hips or pelvis come off the floor. 3. Hold for 15 to 30 seconds, then relax. 4. Repeat 2 to 4 times. Relax and rest    1. Lie on your back with a rolled towel under your neck and a pillow under your knees. Extend your arms comfortably to your sides. 2. Relax and breathe normally. 3. Remain in this position for about 10 minutes. 4. If you can, do this 2 or 3 times each day. Follow-up care is a key part of your treatment and safety. Be sure to make and go to all appointments, and call your doctor if you are having problems. It's also a good idea to know your test results and keep a list of the medicines you take. Where can you learn more? Go to https://Tabletize.com.News Corp. org and sign in to your AnchorFree account. Enter M855 in the SAIC box to learn more about \"Back Stretches: Exercises. \"     If you do not have an account, please click on the \"Sign Up Now\" link. Current as of: July 1, 2021               Content Version: 13.0  © 2006-2021 Healthwise, Incorporated. Care instructions adapted under license by [facility] [facility] ([facility]). If you have questions about a medical condition or this instruction, always ask your healthcare professional. [place] [address] any warranty or liability for your use of this information. Patient Education        Low Back Pain: Exercises  Introduction  Here are some examples of exercises for you to try.  The exercises may be suggested for a condition or for rehabilitation. Start each exercise slowly. Ease off the exercises if you start to have pain. You will be told when to start these exercises and which ones will work best for you. How to do the exercises  Press-up    1. Lie on your stomach, supporting your body with your forearms. 2. Press your elbows down into the floor to raise your upper back. As you do this, relax your stomach muscles and allow your back to arch without using your back muscles. As your press up, do not let your hips or pelvis come off the floor. 3. Hold for 15 to 30 seconds, then relax. 4. Repeat 2 to 4 times. Alternate arm and leg (bird dog) exercise    Do this exercise slowly. Try to keep your body straight at all times, and do not let one hip drop lower than the other. 1. Start on the floor, on your hands and knees. 2. Tighten your belly muscles. 3. Raise one leg off the floor, and hold it straight out behind you. Be careful not to let your hip drop down, because that will twist your trunk. 4. Hold for about 6 seconds, then lower your leg and switch to the other leg. 5. Repeat 8 to 12 times on each leg. 6. Over time, work up to holding for 10 to 30 seconds each time. 7. If you feel stable and secure with your leg raised, try raising the opposite arm straight out in front of you at the same time. Knee-to-chest exercise    1. Lie on your back with your knees bent and your feet flat on the floor. 2. Bring one knee to your chest, keeping the other foot flat on the floor (or keeping the other leg straight, whichever feels better on your lower back). 3. Keep your lower back pressed to the floor. Hold for at least 15 to 30 seconds. 4. Relax, and lower the knee to the starting position. 5. Repeat with the other leg. Repeat 2 to 4 times with each leg. 6. To get more stretch, put your other leg flat on the floor while pulling your knee to your chest.  Curl-ups    1.  Lie on the floor on your back with your knees bent at a 90-degree angle. Your feet should be flat on the floor, about 12 inches from your buttocks. 2. Cross your arms over your chest. If this bothers your neck, try putting your hands behind your neck (not your head), with your elbows spread apart. 3. Slowly tighten your belly muscles and raise your shoulder blades off the floor. 4. Keep your head in line with your body, and do not press your chin to your chest.  5. Hold this position for 1 or 2 seconds, then slowly lower yourself back down to the floor. 6. Repeat 8 to 12 times. Pelvic tilt exercise    1. Lie on your back with your knees bent. 2. \"Brace\" your stomach. This means to tighten your muscles by pulling in and imagining your belly button moving toward your spine. You should feel like your back is pressing to the floor and your hips and pelvis are rocking back. 3. Hold for about 6 seconds while you breathe smoothly. 4. Repeat 8 to 12 times. Heel dig bridging    1. Lie on your back with both knees bent and your ankles bent so that only your heels are digging into the floor. Your knees should be bent about 90 degrees. 2. Then push your heels into the floor, squeeze your buttocks, and lift your hips off the floor until your shoulders, hips, and knees are all in a straight line. 3. Hold for about 6 seconds as you continue to breathe normally, and then slowly lower your hips back down to the floor and rest for up to 10 seconds. 4. Do 8 to 12 repetitions. Hamstring stretch in doorway    1. Lie on your back in a doorway, with one leg through the open door. 2. Slide your leg up the wall to straighten your knee. You should feel a gentle stretch down the back of your leg. 3. Hold the stretch for at least 15 to 30 seconds. Do not arch your back, point your toes, or bend either knee. Keep one heel touching the floor and the other heel touching the wall. 4. Repeat with your other leg. 5. Do 2 to 4 times for each leg. Hip flexor stretch    1.  Kneel on the floor with one knee bent and one leg behind you. Place your forward knee over your foot. Keep your other knee touching the floor. 2. Slowly push your hips forward until you feel a stretch in the upper thigh of your rear leg. 3. Hold the stretch for at least 15 to 30 seconds. Repeat with your other leg. 4. Do 2 to 4 times on each side. Wall sit    1. Stand with your back 10 to 12 inches away from a wall. 2. Lean into the wall until your back is flat against it. 3. Slowly slide down until your knees are slightly bent, pressing your lower back into the wall. 4. Hold for about 6 seconds, then slide back up the wall. 5. Repeat 8 to 12 times. Follow-up care is a key part of your treatment and safety. Be sure to make and go to all appointments, and call your doctor if you are having problems. It's also a good idea to know your test results and keep a list of the medicines you take. Where can you learn more? Go to https://Lancope.Intrinsiq [email or web address] org and sign in to your Silent Edge account. Enter H686 in the 42Networks box to learn more about \"Low Back Pain: Exercises. \"     If you do not have an account, please click on the \"Sign Up Now\" link. Current as of: July 1, 2021               Content Version: 13.0  © 2006-2021 Healthwise, Incorporated. Care instructions adapted under license by [facility] ([facility]). If you have questions about a medical condition or this instruction, always ask your healthcare professional. [place] [address] any warranty or liability for your use of this information.

## 2022-10-21 ENCOUNTER — TELEPHONE (OUTPATIENT)
Dept: FAMILY MEDICINE CLINIC | Age: 67
End: 2022-10-21

## 2022-10-21 DIAGNOSIS — E03.9 ACQUIRED HYPOTHYROIDISM: ICD-10-CM

## 2022-10-21 DIAGNOSIS — I10 ESSENTIAL HYPERTENSION: ICD-10-CM

## 2022-10-21 DIAGNOSIS — Z00.00 WELL ADULT EXAM: Primary | ICD-10-CM

## 2022-10-21 DIAGNOSIS — Z12.5 SCREENING PSA (PROSTATE SPECIFIC ANTIGEN): ICD-10-CM

## 2022-10-21 NOTE — TELEPHONE ENCOUNTER
Patient came in requesting lab work orders for upcoming appointment on 11/25/2022. Patient would like to  at the . Please call to advise.      Thank you,    Stevie Pederson.

## 2022-10-29 LAB
A/G RATIO: 1.8 (ref 1.2–2.2)
ALBUMIN SERPL-MCNC: 4.3 G/DL (ref 3.8–4.8)
ALP BLD-CCNC: 116 IU/L (ref 44–121)
ALT SERPL-CCNC: 22 IU/L (ref 0–44)
AST SERPL-CCNC: 22 IU/L (ref 0–40)
BILIRUB SERPL-MCNC: 0.5 MG/DL (ref 0–1.2)
BUN / CREAT RATIO: 14 (ref 10–24)
BUN BLDV-MCNC: 15 MG/DL (ref 8–27)
CALCIUM SERPL-MCNC: 9.1 MG/DL (ref 8.6–10.2)
CHLORIDE BLD-SCNC: 104 MMOL/L (ref 96–106)
CHOLESTEROL, TOTAL: 164 MG/DL (ref 100–199)
CO2: 24 MMOL/L (ref 20–29)
COMMENT: NORMAL
CREAT SERPL-MCNC: 1.09 MG/DL (ref 0.76–1.27)
ESTIMATED GLOMERULAR FILTRATION RATE CREATININE EQUATION: 73 ML/MIN/1.73
GLOBULIN: 2.4 G/DL (ref 1.5–4.5)
GLUCOSE BLD-MCNC: 83 MG/DL (ref 70–99)
HDLC SERPL-MCNC: 57 MG/DL
LDL CHOLESTEROL CALCULATED: 92 MG/DL (ref 0–99)
POTASSIUM SERPL-SCNC: 4.4 MMOL/L (ref 3.5–5.2)
PROSTATE SPECIFIC ANTIGEN: 0.5 NG/ML (ref 0–4)
SODIUM BLD-SCNC: 140 MMOL/L (ref 134–144)
TOTAL PROTEIN: 6.7 G/DL (ref 6–8.5)
TRIGL SERPL-MCNC: 77 MG/DL (ref 0–149)
TSH SERPL DL<=0.05 MIU/L-ACNC: 1.61 UIU/ML (ref 0.45–4.5)
VLDLC SERPL CALC-MCNC: 15 MG/DL (ref 5–40)

## 2022-11-25 ENCOUNTER — OFFICE VISIT (OUTPATIENT)
Dept: FAMILY MEDICINE CLINIC | Age: 67
End: 2022-11-25
Payer: COMMERCIAL

## 2022-11-25 VITALS
DIASTOLIC BLOOD PRESSURE: 80 MMHG | OXYGEN SATURATION: 98 % | WEIGHT: 228 LBS | BODY MASS INDEX: 31.92 KG/M2 | HEIGHT: 71 IN | SYSTOLIC BLOOD PRESSURE: 120 MMHG | HEART RATE: 60 BPM

## 2022-11-25 DIAGNOSIS — H40.9 GLAUCOMA OF BOTH EYES, UNSPECIFIED GLAUCOMA TYPE: ICD-10-CM

## 2022-11-25 DIAGNOSIS — E03.9 ACQUIRED HYPOTHYROIDISM: ICD-10-CM

## 2022-11-25 DIAGNOSIS — I10 ESSENTIAL HYPERTENSION: Primary | ICD-10-CM

## 2022-11-25 PROCEDURE — G8417 CALC BMI ABV UP PARAM F/U: HCPCS | Performed by: FAMILY MEDICINE

## 2022-11-25 PROCEDURE — 3078F DIAST BP <80 MM HG: CPT | Performed by: FAMILY MEDICINE

## 2022-11-25 PROCEDURE — 1036F TOBACCO NON-USER: CPT | Performed by: FAMILY MEDICINE

## 2022-11-25 PROCEDURE — 3017F COLORECTAL CA SCREEN DOC REV: CPT | Performed by: FAMILY MEDICINE

## 2022-11-25 PROCEDURE — 99214 OFFICE O/P EST MOD 30 MIN: CPT | Performed by: FAMILY MEDICINE

## 2022-11-25 PROCEDURE — 3074F SYST BP LT 130 MM HG: CPT | Performed by: FAMILY MEDICINE

## 2022-11-25 PROCEDURE — G8484 FLU IMMUNIZE NO ADMIN: HCPCS | Performed by: FAMILY MEDICINE

## 2022-11-25 PROCEDURE — 1123F ACP DISCUSS/DSCN MKR DOCD: CPT | Performed by: FAMILY MEDICINE

## 2022-11-25 PROCEDURE — G8427 DOCREV CUR MEDS BY ELIG CLIN: HCPCS | Performed by: FAMILY MEDICINE

## 2022-11-25 RX ORDER — LISINOPRIL 20 MG/1
20 TABLET ORAL DAILY
Qty: 90 TABLET | Refills: 3 | Status: SHIPPED | OUTPATIENT
Start: 2022-11-25 | End: 2022-11-28 | Stop reason: SDUPTHER

## 2022-11-25 RX ORDER — LEVOTHYROXINE SODIUM 0.2 MG/1
200 TABLET ORAL DAILY
Qty: 90 TABLET | Refills: 3 | Status: SHIPPED | OUTPATIENT
Start: 2022-11-25 | End: 2022-11-28 | Stop reason: SDUPTHER

## 2022-11-25 RX ORDER — LATANOPROSTENE BUNOD 0.24 MG/ML
1 SOLUTION/ DROPS OPHTHALMIC NIGHTLY
COMMUNITY

## 2022-11-25 SDOH — ECONOMIC STABILITY: FOOD INSECURITY: WITHIN THE PAST 12 MONTHS, THE FOOD YOU BOUGHT JUST DIDN'T LAST AND YOU DIDN'T HAVE MONEY TO GET MORE.: NEVER TRUE

## 2022-11-25 SDOH — ECONOMIC STABILITY: FOOD INSECURITY: WITHIN THE PAST 12 MONTHS, YOU WORRIED THAT YOUR FOOD WOULD RUN OUT BEFORE YOU GOT MONEY TO BUY MORE.: NEVER TRUE

## 2022-11-25 ASSESSMENT — PATIENT HEALTH QUESTIONNAIRE - PHQ9
SUM OF ALL RESPONSES TO PHQ QUESTIONS 1-9: 0
SUM OF ALL RESPONSES TO PHQ QUESTIONS 1-9: 0
2. FEELING DOWN, DEPRESSED OR HOPELESS: 0
SUM OF ALL RESPONSES TO PHQ QUESTIONS 1-9: 0
SUM OF ALL RESPONSES TO PHQ9 QUESTIONS 1 & 2: 0
SUM OF ALL RESPONSES TO PHQ QUESTIONS 1-9: 0
1. LITTLE INTEREST OR PLEASURE IN DOING THINGS: 0

## 2022-11-25 ASSESSMENT — SOCIAL DETERMINANTS OF HEALTH (SDOH): HOW HARD IS IT FOR YOU TO PAY FOR THE VERY BASICS LIKE FOOD, HOUSING, MEDICAL CARE, AND HEATING?: NOT HARD AT ALL

## 2022-11-25 NOTE — PROGRESS NOTES
Chief Complaint   Patient presents with    Follow-up     Patient is here for a follow up/B/P check. Discuss lab. Juvenal Herndon Sr. is a 69 y.o. male who presents for follow-up of HTN. Checks BP at home: Yes  BP numbers: not often  Taking medication daily: Yes (lisinopril)  Side Effects of medication: no      Thyroid - taking reg, no SE      Saw [facility] - told glaucoma, now on meds. Also told cataracts and needs surgery. Body mass index is 32.25 kg/m². Vitals:    11/25/22 0804   BP: 120/80   Pulse: 60   SpO2: 98%   Weight: 228 lb (103.4 kg)   Height: 5' 10.5\" (1.791 m)     Wt Readings from Last 3 Encounters:   11/25/22 228 lb (103.4 kg)   12/02/21 224 lb 12.8 oz (102 kg)   01/29/21 227 lb 3.2 oz (103.1 kg)     Today's PHQ:    PHQ Scores 11/25/2022 1/29/2021 10/23/2020 11/1/2019 9/1/2018   PHQ2 Score 0 0 0 0 0   PHQ9 Score 0 0 0 0 0     Interpretation of Total Score Depression Severity: 1-4 = Minimal depression, 5-9 = Mild depression, 10-14 = Moderate depression, 15-19 = Moderately severe depression, 20-27 = Severe depression     No data recorded      Interpretation of Total Score Depression Severity: 1-4 = Minimal depression, 5-9 = Mild depression, 10-14 = Moderate depression, 15-19 = Moderately severe depression, 20-27 = Severe depression     GENERAL:Alert and oriented x 4 NAD, affect appropriate and obese, well hydrated, well developed. LUNG:clear to auscultation bilaterally with normal respiratory effort  CV: Normal heart sounds, regular rate and rhythm without murmurs  EXTREMETY: no edema, normal pedal pulses bilaterally            ASSESSMENT AND PLAN:       Sean Bhakta was seen today for follow-up. Diagnoses and all orders for this visit:    Essential hypertension  -Stable, continue current medications. Reviewed pre-visit labs with patient. (Lipid, CMP, TSH, and PSA) Review of these labs contributed to MDM. Acquired hypothyroidism  -Stable, continue current medications.     Glaucoma of both eyes, unspecified glaucoma type  Follows with ophthalomology    Other orders  -     Discontinue: levothyroxine (LEVOXYL) 200 MCG tablet; Take 1 tablet by mouth Daily  -     Discontinue: lisinopril (PRINIVIL;ZESTRIL) 20 MG tablet;  Take 1 tablet by mouth daily    Declines flu and covid vax

## 2022-11-28 RX ORDER — LISINOPRIL 20 MG/1
20 TABLET ORAL DAILY
Qty: 90 TABLET | Refills: 3 | Status: SHIPPED | OUTPATIENT
Start: 2022-11-28

## 2022-11-28 RX ORDER — LEVOTHYROXINE SODIUM 0.2 MG/1
200 TABLET ORAL DAILY
Qty: 90 TABLET | Refills: 3 | Status: SHIPPED | OUTPATIENT
Start: 2022-11-28

## 2022-11-28 NOTE — TELEPHONE ENCOUNTER
Medication:   Requested Prescriptions     Pending Prescriptions Disp Refills    levothyroxine (LEVOXYL) 200 MCG tablet 90 tablet 3     Sig: Take 1 tablet by mouth Daily    lisinopril (PRINIVIL;ZESTRIL) 20 MG tablet 90 tablet 3     Sig: Take 1 tablet by mouth daily      Different pharmacy than where last Rx was sent. Patient Phone Number: [phone] (home)     Last appt: 11/25/2022   Next appt: Visit date not found    Last OARRS: No flowsheet data found.   PDMP Monitoring:    Last PDMP Eva Banks as Reviewed [facility]):  Review User Review Instant Review Result          Preferred Pharmacy:   [facility] [address] SABIHA ALANISSUMANTH [address] [place] [address] [phone] -  [phone]  [address] [place] [address]  [address] [place], [place] [id number]  Phone: [phone] Fax: [phone]    [facility] [id number] [address] [phone] [place] [phone]  [address] [place] [address]  [address] [place] [address]  Phone: [phone] Fax: [phone] [address],[place] [address], [address] [address]  Phone: [phone] Fax: [phone]

## 2023-01-16 RX ORDER — MOXIFLOXACIN 5 MG/ML
1 SOLUTION/ DROPS OPHTHALMIC 3 TIMES DAILY
Qty: 9 ML | Refills: 3 | OUTPATIENT
Start: 2023-01-16 | End: 2023-02-15

## 2023-01-16 RX ORDER — LATANOPROSTENE BUNOD 0.24 MG/ML
1 SOLUTION/ DROPS OPHTHALMIC NIGHTLY
Qty: 5 ML | Refills: 3 | OUTPATIENT
Start: 2023-01-16

## 2023-01-16 RX ORDER — PILOCARPINE HYDROCHLORIDE 10 MG/ML
1 SOLUTION/ DROPS OPHTHALMIC 3 TIMES DAILY
Qty: 10 ML | Refills: 3 | OUTPATIENT
Start: 2023-01-16

## 2023-01-16 NOTE — TELEPHONE ENCOUNTER
Medication:   Requested Prescriptions     Pending Prescriptions Disp Refills    moxifloxacin (VIGAMOX) 0.5 % ophthalmic solution 3 mL 3     Sig: Place 1 drop into both eyes 3 times daily    Latanoprostene Bunod (VYZULTA) 0.024 % SOLN 5 mL 3     Sig: Apply 1 drop to eye at bedtime Both eyes    pilocarpine (ISOPTO CARPINE) 1 % ophthalmic solution 10 mL 3     Sig: Place 1 drop into both eyes 3 times daily          Patient Phone Number: [phone] (home)     Last appt: 11/25/2022   Next appt: 2/27/2023    Last OARRS: No flowsheet data found.   PDMP Monitoring:    Last PDMP [facility] SYSTEM as Reviewed [facility]):  Review User Review Instant Review Result          Preferred Pharmacy:   [address] [place] [address] [phone]  [address]  [address] [place] [address]  Phone: [phone] Fax: [phone]    [facility] [id number] [address] [phone] [facility] [phone]  [address]  [address] [address]  Phone: [phone] Fax: [phone] [address],[place] [address], [address] [address]  Phone: [phone] Fax: [phone]

## 2023-02-24 NOTE — H&P (VIEW-ONLY)
tablet by mouth daily 90 tablet 3    Latanoprostene Bunod (VYZULTA) 0.024 % SOLN Apply 1 drop to eye at bedtime Both eyes         Social History     Tobacco Use    Smoking status: Former     Packs/day: 0.50     Years: 2.00     Pack years: 1.00     Types: Cigarettes     Quit date: 1985     Years since quittin.1    Smokeless tobacco: Never    Tobacco comments:     quit 40 years ago   Substance Use Topics    Alcohol use: No         REVIEW OF SYSTEMS:    Constitutional:  Feeling well, No fever, chills, no weight change, no fatigue  Eyes:  no vision loss/disturbance  Ears, nose, mouth, throat, and face:    No hearing change, no sore throat, no rhinorrhea, no nasal congestion  Respiratory:   no cough, no shortness of breath, no dyspnea on exertion,   Cardiovascular:   No chest pain, no palpitations, no irregular heart beat, no edema  Gastrointestinal:   No change in bowels, no pain, no nausea or vomiting, no blood or black tarry stool  Genitourinary:   No change in bladder, no nocturia  Musculoskeletal:    No joint pain  Behavioral/Psych:    No depression, no anxiety  Skin:    No rashes, no new lesions    PHYSICAL EXAM  Vitals:    23 1549   BP: 130/80   Site: Left Upper Arm   Position: Sitting   Cuff Size: Large Adult   Temp: 97.5 °F (36.4 °C)   TempSrc: Infrared   Weight: 232 lb 3.2 oz (105.3 kg)     Body mass index is 32.85 kg/m². CONSTITUTIONAL: Alert and oriented x 4 NAD, affect appropriate and obese, well hydrated, well developed. Very [place] even with hearing aids    Eyes:  Lids and lashes normal, pupils equal, round and reactive to light, extra ocular muscles intact, sclera clear, conjunctiva normal    Head/ENT:  Normocephalic, without obvious abnormality, atramatic, external ears without lesions, Canals normal, TM clear bilaterally, oral pharynx with moist mucus membranes, tonsils without erythema or exudates, gums normal and good dentition.     Neck:  Supple, symmetrical, trachea midline, no

## 2023-02-24 NOTE — H&P (VIEW-ONLY)
Preoperative Consultation    Chief Complaint   Patient presents with    Pre-op Exam       This patient presents to the office today for a preoperative consultation at the request of surgeon, Dr. Jason Gonzales, who plans on performing Left Cataract removal on March 9 at [facility] and Right on March 23. Planned anesthesia: Local   Known anesthesia problems: None, no family history of anesthesia problems.   Bleeding risk: No recent or remote history of abnormal bleeding  Personal or FH of DVT/PE: No        Patient Active Problem List   Diagnosis    Calculus of kidney    Diverticulosis of large intestine    Essential hypertension    Acquired hypothyroidism    DDD (degenerative disc disease), lumbar    Glaucoma of both eyes       Past Medical History:   Diagnosis Date    History of MI (myocardial infarction) 2013    nl coronaries, due to heat stroke and ARF, pt states it was not an MI but heat stroke, all the heart testing was normal    [place] (hard of hearing)     bilateral, no hearing aids    Hypertension     Thyroid disease      Past Surgical History:   Procedure Laterality Date    COLONOSCOPY      ENDOSCOPY, COLON, DIAGNOSTIC      INGUINAL HERNIA REPAIR Right     INGUINAL HERNIA REPAIR Left 01/04/2017    LAPAROSCOPIC LEFT INGUINAL HERNIA REPAIR WITH MESH    TONSILLECTOMY       Family History   Problem Relation Age of Onset    High Blood Pressure Mother     Prostate Cancer Father     No Known Problems Brother     No Known Problems Brother     No Known Problems Daughter     No Known Problems Daughter     No Known Problems Daughter     No Known Problems Son        Allergies   Allergen Reactions    Clindamycin/Lincomycin     Clindamycin/Lincomycin Hives     Outpatient Medications Marked as Taking for the 2/27/23 encounter (Office Visit) with Lauri Chicas MD   Medication Sig Dispense Refill    levothyroxine (LEVOXYL) 200 MCG tablet Take 1 tablet by mouth Daily 90 tablet 3    lisinopril (PRINIVIL;ZESTRIL) 20 MG tablet Take 1 tablet by mouth daily 90 tablet 3    Latanoprostene Bunod (VYZULTA) 0.024 % SOLN Apply 1 drop to eye at bedtime Both eyes         Social History     Tobacco Use    Smoking status: Former     Packs/day: 0.50     Years: 2.00     Pack years: 1.00     Types: Cigarettes     Quit date: 1985     Years since quittin.1    Smokeless tobacco: Never    Tobacco comments:     quit 40 years ago   Substance Use Topics    Alcohol use: No         REVIEW OF SYSTEMS:    Constitutional:  Feeling well, No fever, chills, no weight change, no fatigue  Eyes:  no vision loss/disturbance  Ears, nose, mouth, throat, and face:    No hearing change, no sore throat, no rhinorrhea, no nasal congestion  Respiratory:   no cough, no shortness of breath, no dyspnea on exertion,   Cardiovascular:   No chest pain, no palpitations, no irregular heart beat, no edema  Gastrointestinal:   No change in bowels, no pain, no nausea or vomiting, no blood or black tarry stool  Genitourinary:   No change in bladder, no nocturia  Musculoskeletal:    No joint pain  Behavioral/Psych:    No depression, no anxiety  Skin:    No rashes, no new lesions    PHYSICAL EXAM  Vitals:    23 1549   BP: 130/80   Site: Left Upper Arm   Position: Sitting   Cuff Size: Large Adult   Temp: 97.5 °F (36.4 °C)   TempSrc: Infrared   Weight: 232 lb 3.2 oz (105.3 kg)     Body mass index is 32.85 kg/m². CONSTITUTIONAL: Alert and oriented x 4 NAD, affect appropriate and obese, well hydrated, well developed. Very [place] even with hearing aids    Eyes:  Lids and lashes normal, pupils equal, round and reactive to light, extra ocular muscles intact, sclera clear, conjunctiva normal    Head/ENT:  Normocephalic, without obvious abnormality, atramatic, external ears without lesions, Canals normal, TM clear bilaterally, oral pharynx with moist mucus membranes, tonsils without erythema or exudates, gums normal and good dentition.     Neck:  Supple, symmetrical, trachea midline, no adenopathy, thyroid symmetric, not enlarged and no tenderness, skin normal    Heart: Regular rate and rhythm, normal S1 and S2, and no murmur noted    Lungs:  No increased work of breathing, good air exchange, clear to auscultation bilaterally    Abdomen:  soft, non-distended, non-tender, no masses palpated, no hepatosplenomegally    Extremities:  No loss of hair, no edema, nl pedal pulses bilaterally, no skin lesions    NEUROLOGIC:Cranial nerves II-XII are grossly intact. Motor is 5 out of 5 bilaterally.            Assessment:           ASSESSMENT AND PLAN:       Yadiel Rod was seen today for pre-op exam.    Diagnoses and all orders for this visit:    Age-related cataract of both eyes, unspecified age-related cataract type    Preop examination    Medically stable for planned procedure

## 2023-02-24 NOTE — PROGRESS NOTES
Preoperative Consultation    Chief Complaint   Patient presents with    Pre-op Exam       This patient presents to the office today for a preoperative consultation at the request of surgeon, Dr. Emerita Wick, who plans on performing Left Cataract removal on March 9 at [facility] and Right on March 23. Planned anesthesia: Local   Known anesthesia problems: None, no family history of anesthesia problems.   Bleeding risk: No recent or remote history of abnormal bleeding  Personal or FH of DVT/PE: No        Patient Active Problem List   Diagnosis    Calculus of kidney    Diverticulosis of large intestine    Essential hypertension    Acquired hypothyroidism    DDD (degenerative disc disease), lumbar    Glaucoma of both eyes       Past Medical History:   Diagnosis Date    History of MI (myocardial infarction) 2013    nl coronaries, due to heat stroke and ARF, pt states it was not an MI but heat stroke, all the heart testing was normal    [place] (hard of hearing)     bilateral, no hearing aids    Hypertension     Thyroid disease      Past Surgical History:   Procedure Laterality Date    COLONOSCOPY      ENDOSCOPY, COLON, DIAGNOSTIC      INGUINAL HERNIA REPAIR Right     INGUINAL HERNIA REPAIR Left 01/04/2017    LAPAROSCOPIC LEFT INGUINAL HERNIA REPAIR WITH MESH    TONSILLECTOMY       Family History   Problem Relation Age of Onset    High Blood Pressure Mother     Prostate Cancer Father     No Known Problems Brother     No Known Problems Brother     No Known Problems Daughter     No Known Problems Daughter     No Known Problems Daughter     No Known Problems Son        Allergies   Allergen Reactions    Clindamycin/Lincomycin     Clindamycin/Lincomycin Hives     Outpatient Medications Marked as Taking for the 2/27/23 encounter (Office Visit) with Kaitlin Reynolds MD   Medication Sig Dispense Refill    levothyroxine (LEVOXYL) 200 MCG tablet Take 1 tablet by mouth Daily 90 tablet 3    lisinopril (PRINIVIL;ZESTRIL) 20 MG tablet Take 1 tablet by mouth daily 90 tablet 3    Latanoprostene Bunod (VYZULTA) 0.024 % SOLN Apply 1 drop to eye at bedtime Both eyes         Social History     Tobacco Use    Smoking status: Former     Packs/day: 0.50     Years: 2.00     Pack years: 1.00     Types: Cigarettes     Quit date: 1985     Years since quittin.1    Smokeless tobacco: Never    Tobacco comments:     quit 40 years ago   Substance Use Topics    Alcohol use: No         REVIEW OF SYSTEMS:    Constitutional:  Feeling well, No fever, chills, no weight change, no fatigue  Eyes:  no vision loss/disturbance  Ears, nose, mouth, throat, and face:    No hearing change, no sore throat, no rhinorrhea, no nasal congestion  Respiratory:   no cough, no shortness of breath, no dyspnea on exertion,   Cardiovascular:   No chest pain, no palpitations, no irregular heart beat, no edema  Gastrointestinal:   No change in bowels, no pain, no nausea or vomiting, no blood or black tarry stool  Genitourinary:   No change in bladder, no nocturia  Musculoskeletal:    No joint pain  Behavioral/Psych:    No depression, no anxiety  Skin:    No rashes, no new lesions    PHYSICAL EXAM  Vitals:    23 1549   BP: 130/80   Site: Left Upper Arm   Position: Sitting   Cuff Size: Large Adult   Temp: 97.5 °F (36.4 °C)   TempSrc: Infrared   Weight: 232 lb 3.2 oz (105.3 kg)     Body mass index is 32.85 kg/m². CONSTITUTIONAL: Alert and oriented x 4 NAD, affect appropriate and obese, well hydrated, well developed. Very [place] even with hearing aids    Eyes:  Lids and lashes normal, pupils equal, round and reactive to light, extra ocular muscles intact, sclera clear, conjunctiva normal    Head/ENT:  Normocephalic, without obvious abnormality, atramatic, external ears without lesions, Canals normal, TM clear bilaterally, oral pharynx with moist mucus membranes, tonsils without erythema or exudates, gums normal and good dentition.     Neck:  Supple, symmetrical, trachea midline, no adenopathy, thyroid symmetric, not enlarged and no tenderness, skin normal    Heart: Regular rate and rhythm, normal S1 and S2, and no murmur noted    Lungs:  No increased work of breathing, good air exchange, clear to auscultation bilaterally    Abdomen:  soft, non-distended, non-tender, no masses palpated, no hepatosplenomegally    Extremities:  No loss of hair, no edema, nl pedal pulses bilaterally, no skin lesions    NEUROLOGIC:Cranial nerves II-XII are grossly intact. Motor is 5 out of 5 bilaterally.            Assessment:           ASSESSMENT AND PLAN:       Michelle Vega was seen today for pre-op exam.    Diagnoses and all orders for this visit:    Age-related cataract of both eyes, unspecified age-related cataract type    Preop examination    Medically stable for planned procedure

## 2023-02-27 ENCOUNTER — OFFICE VISIT (OUTPATIENT)
Dept: FAMILY MEDICINE CLINIC | Age: 68
End: 2023-02-27
Payer: COMMERCIAL

## 2023-02-27 VITALS
BODY MASS INDEX: 32.85 KG/M2 | TEMPERATURE: 97.5 F | SYSTOLIC BLOOD PRESSURE: 130 MMHG | DIASTOLIC BLOOD PRESSURE: 80 MMHG | WEIGHT: 232.2 LBS

## 2023-02-27 DIAGNOSIS — H25.9 AGE-RELATED CATARACT OF BOTH EYES, UNSPECIFIED AGE-RELATED CATARACT TYPE: Primary | ICD-10-CM

## 2023-02-27 DIAGNOSIS — Z01.818 PREOP EXAMINATION: ICD-10-CM

## 2023-02-27 PROCEDURE — 99213 OFFICE O/P EST LOW 20 MIN: CPT | Performed by: FAMILY MEDICINE

## 2023-02-27 PROCEDURE — 3079F DIAST BP 80-89 MM HG: CPT | Performed by: FAMILY MEDICINE

## 2023-02-27 PROCEDURE — 3075F SYST BP GE 130 - 139MM HG: CPT | Performed by: FAMILY MEDICINE

## 2023-02-27 PROCEDURE — 1123F ACP DISCUSS/DSCN MKR DOCD: CPT | Performed by: FAMILY MEDICINE

## 2023-02-27 SDOH — ECONOMIC STABILITY: INCOME INSECURITY: HOW HARD IS IT FOR YOU TO PAY FOR THE VERY BASICS LIKE FOOD, HOUSING, MEDICAL CARE, AND HEATING?: NOT HARD AT ALL

## 2023-02-27 SDOH — ECONOMIC STABILITY: FOOD INSECURITY: WITHIN THE PAST 12 MONTHS, YOU WORRIED THAT YOUR FOOD WOULD RUN OUT BEFORE YOU GOT MONEY TO BUY MORE.: NEVER TRUE

## 2023-02-27 SDOH — ECONOMIC STABILITY: HOUSING INSECURITY
IN THE LAST 12 MONTHS, WAS THERE A TIME WHEN YOU DID NOT HAVE A STEADY PLACE TO SLEEP OR SLEPT IN A SHELTER (INCLUDING NOW)?: NO

## 2023-02-27 SDOH — ECONOMIC STABILITY: FOOD INSECURITY: WITHIN THE PAST 12 MONTHS, THE FOOD YOU BOUGHT JUST DIDN'T LAST AND YOU DIDN'T HAVE MONEY TO GET MORE.: NEVER TRUE

## 2023-02-27 ASSESSMENT — PATIENT HEALTH QUESTIONNAIRE - PHQ9
SUM OF ALL RESPONSES TO PHQ QUESTIONS 1-9: 0
1. LITTLE INTEREST OR PLEASURE IN DOING THINGS: 0
SUM OF ALL RESPONSES TO PHQ QUESTIONS 1-9: 0
SUM OF ALL RESPONSES TO PHQ QUESTIONS 1-9: 0
2. FEELING DOWN, DEPRESSED OR HOPELESS: 0
SUM OF ALL RESPONSES TO PHQ9 QUESTIONS 1 & 2: 0
SUM OF ALL RESPONSES TO PHQ QUESTIONS 1-9: 0

## 2023-03-08 ENCOUNTER — ANESTHESIA EVENT (OUTPATIENT)
Dept: SURGERY | Age: 68
End: 2023-03-08
Payer: COMMERCIAL

## 2023-03-09 ENCOUNTER — ANESTHESIA (OUTPATIENT)
Dept: SURGERY | Age: 68
End: 2023-03-09
Payer: COMMERCIAL

## 2023-03-09 ENCOUNTER — HOSPITAL ENCOUNTER (OUTPATIENT)
Age: 68
Setting detail: OUTPATIENT SURGERY
Discharge: HOME | End: 2023-03-09
Attending: OPHTHALMOLOGY | Admitting: OPHTHALMOLOGY
Payer: COMMERCIAL

## 2023-03-09 VITALS
OXYGEN SATURATION: 98 % | SYSTOLIC BLOOD PRESSURE: 138 MMHG | WEIGHT: 227 LBS | HEIGHT: 71 IN | DIASTOLIC BLOOD PRESSURE: 81 MMHG | HEART RATE: 58 BPM | BODY MASS INDEX: 31.78 KG/M2 | TEMPERATURE: 98.7 F | RESPIRATION RATE: 18 BRPM

## 2023-03-09 PROCEDURE — 2500000003 HC RX 250 WO HCPCS: Performed by: OPHTHALMOLOGY

## 2023-03-09 PROCEDURE — 3700000001 HC ADD 15 MINUTES (ANESTHESIA): Performed by: OPHTHALMOLOGY

## 2023-03-09 PROCEDURE — 3600000014 HC SURGERY LEVEL 4 ADDTL 15MIN: Performed by: OPHTHALMOLOGY

## 2023-03-09 PROCEDURE — 6360000002 HC RX W HCPCS: Performed by: NURSE ANESTHETIST, CERTIFIED REGISTERED

## 2023-03-09 PROCEDURE — 3700000000 HC ANESTHESIA ATTENDED CARE: Performed by: OPHTHALMOLOGY

## 2023-03-09 PROCEDURE — 3600000004 HC SURGERY LEVEL 4 BASE: Performed by: OPHTHALMOLOGY

## 2023-03-09 PROCEDURE — 2709999900 HC NON-CHARGEABLE SUPPLY: Performed by: OPHTHALMOLOGY

## 2023-03-09 PROCEDURE — 2580000003 HC RX 258: Performed by: ANESTHESIOLOGY

## 2023-03-09 PROCEDURE — 6370000000 HC RX 637 (ALT 250 FOR IP): Performed by: OPHTHALMOLOGY

## 2023-03-09 PROCEDURE — V2632 POST CHMBR INTRAOCULAR LENS: HCPCS | Performed by: OPHTHALMOLOGY

## 2023-03-09 PROCEDURE — 2720000010 HC SURG SUPPLY STERILE: Performed by: OPHTHALMOLOGY

## 2023-03-09 PROCEDURE — 7100000010 HC PHASE II RECOVERY - FIRST 15 MIN: Performed by: OPHTHALMOLOGY

## 2023-03-09 DEVICE — LENS CLAREON IOL 19.0D: Type: IMPLANTABLE DEVICE | Site: EYE | Status: FUNCTIONAL

## 2023-03-09 RX ORDER — SODIUM CHLORIDE 0.9 % (FLUSH) 0.9 %
5-40 SYRINGE (ML) INJECTION EVERY 12 HOURS SCHEDULED
Status: CANCELLED | OUTPATIENT
Start: 2023-03-09

## 2023-03-09 RX ORDER — SODIUM CHLORIDE 9 MG/ML
INJECTION, SOLUTION INTRAVENOUS PRN
Status: CANCELLED | OUTPATIENT
Start: 2023-03-09

## 2023-03-09 RX ORDER — CYCLOPENTOLATE HYDROCHLORIDE 10 MG/ML
1 SOLUTION/ DROPS OPHTHALMIC SEE ADMIN INSTRUCTIONS
Status: DISCONTINUED | OUTPATIENT
Start: 2023-03-09 | End: 2023-03-09 | Stop reason: HOSPADM

## 2023-03-09 RX ORDER — PHENYLEPHRINE HCL 2.5 %
1 DROPS OPHTHALMIC (EYE) SEE ADMIN INSTRUCTIONS
Status: DISCONTINUED | OUTPATIENT
Start: 2023-03-09 | End: 2023-03-09 | Stop reason: HOSPADM

## 2023-03-09 RX ORDER — ONDANSETRON 2 MG/ML
4 INJECTION INTRAMUSCULAR; INTRAVENOUS
Status: CANCELLED | OUTPATIENT
Start: 2023-03-09 | End: 2023-03-10

## 2023-03-09 RX ORDER — BRIMONIDINE TARTRATE 2 MG/ML
SOLUTION/ DROPS OPHTHALMIC
Status: COMPLETED | OUTPATIENT
Start: 2023-03-09 | End: 2023-03-09

## 2023-03-09 RX ORDER — DIPHENHYDRAMINE HYDROCHLORIDE 50 MG/ML
12.5 INJECTION INTRAMUSCULAR; INTRAVENOUS
Status: CANCELLED | OUTPATIENT
Start: 2023-03-09 | End: 2023-03-10

## 2023-03-09 RX ORDER — TROPICAMIDE 10 MG/ML
1 SOLUTION/ DROPS OPHTHALMIC SEE ADMIN INSTRUCTIONS
Status: DISCONTINUED | OUTPATIENT
Start: 2023-03-09 | End: 2023-03-09 | Stop reason: HOSPADM

## 2023-03-09 RX ORDER — CIPROFLOXACIN HYDROCHLORIDE 3.5 MG/ML
1 SOLUTION/ DROPS TOPICAL SEE ADMIN INSTRUCTIONS
Status: DISCONTINUED | OUTPATIENT
Start: 2023-03-09 | End: 2023-03-09 | Stop reason: HOSPADM

## 2023-03-09 RX ORDER — MIDAZOLAM HYDROCHLORIDE 1 MG/ML
INJECTION INTRAMUSCULAR; INTRAVENOUS PRN
Status: DISCONTINUED | OUTPATIENT
Start: 2023-03-09 | End: 2023-03-09 | Stop reason: SDUPTHER

## 2023-03-09 RX ORDER — SODIUM CHLORIDE 0.9 % (FLUSH) 0.9 %
5-40 SYRINGE (ML) INJECTION PRN
Status: DISCONTINUED | OUTPATIENT
Start: 2023-03-09 | End: 2023-03-09 | Stop reason: HOSPADM

## 2023-03-09 RX ORDER — BALANCED SALT SOLUTION 6.4; .75; .48; .3; 3.9; 1.7 MG/ML; MG/ML; MG/ML; MG/ML; MG/ML; MG/ML
SOLUTION OPHTHALMIC
Status: COMPLETED | OUTPATIENT
Start: 2023-03-09 | End: 2023-03-09

## 2023-03-09 RX ORDER — TETRACAINE HYDROCHLORIDE 5 MG/ML
1 SOLUTION OPHTHALMIC SEE ADMIN INSTRUCTIONS
Status: DISCONTINUED | OUTPATIENT
Start: 2023-03-09 | End: 2023-03-09 | Stop reason: HOSPADM

## 2023-03-09 RX ORDER — TETRACAINE HYDROCHLORIDE 5 MG/ML
SOLUTION OPHTHALMIC
Status: COMPLETED | OUTPATIENT
Start: 2023-03-09 | End: 2023-03-09

## 2023-03-09 RX ORDER — SODIUM CHLORIDE 9 MG/ML
INJECTION, SOLUTION INTRAVENOUS PRN
Status: DISCONTINUED | OUTPATIENT
Start: 2023-03-09 | End: 2023-03-09 | Stop reason: HOSPADM

## 2023-03-09 RX ORDER — OFLOXACIN 3 MG/ML
SOLUTION/ DROPS OPHTHALMIC
Status: COMPLETED | OUTPATIENT
Start: 2023-03-09 | End: 2023-03-09

## 2023-03-09 RX ORDER — PREDNISOLONE ACETATE 10 MG/ML
SUSPENSION/ DROPS OPHTHALMIC
Status: COMPLETED | OUTPATIENT
Start: 2023-03-09 | End: 2023-03-09

## 2023-03-09 RX ORDER — KETOROLAC TROMETHAMINE 5 MG/ML
1 SOLUTION OPHTHALMIC SEE ADMIN INSTRUCTIONS
Status: DISCONTINUED | OUTPATIENT
Start: 2023-03-09 | End: 2023-03-09 | Stop reason: HOSPADM

## 2023-03-09 RX ORDER — SODIUM CHLORIDE 0.9 % (FLUSH) 0.9 %
5-40 SYRINGE (ML) INJECTION EVERY 12 HOURS SCHEDULED
Status: DISCONTINUED | OUTPATIENT
Start: 2023-03-09 | End: 2023-03-09 | Stop reason: HOSPADM

## 2023-03-09 RX ORDER — SODIUM CHLORIDE 0.9 % (FLUSH) 0.9 %
5-40 SYRINGE (ML) INJECTION PRN
Status: CANCELLED | OUTPATIENT
Start: 2023-03-09

## 2023-03-09 RX ADMIN — TROPICAMIDE 1 DROP: 10 SOLUTION/ DROPS OPHTHALMIC at 12:26

## 2023-03-09 RX ADMIN — CYCLOPENTOLATE HYDROCHLORIDE 1 DROP: 10 SOLUTION OPHTHALMIC at 12:26

## 2023-03-09 RX ADMIN — TETRACAINE HYDROCHLORIDE 1 DROP: 5 SOLUTION OPHTHALMIC at 12:20

## 2023-03-09 RX ADMIN — CIPROFLOXACIN 1 DROP: 3 SOLUTION OPHTHALMIC at 12:26

## 2023-03-09 RX ADMIN — CIPROFLOXACIN 1 DROP: 3 SOLUTION OPHTHALMIC at 12:20

## 2023-03-09 RX ADMIN — KETOROLAC TROMETHAMINE 1 DROP: 0.5 SOLUTION OPHTHALMIC at 12:20

## 2023-03-09 RX ADMIN — TROPICAMIDE 1 DROP: 10 SOLUTION/ DROPS OPHTHALMIC at 12:20

## 2023-03-09 RX ADMIN — PHENYLEPHRINE HYDROCHLORIDE 1 DROP: 25 SOLUTION/ DROPS OPHTHALMIC at 12:26

## 2023-03-09 RX ADMIN — SODIUM CHLORIDE: 9 INJECTION, SOLUTION INTRAVENOUS at 12:26

## 2023-03-09 RX ADMIN — PHENYLEPHRINE HYDROCHLORIDE 1 DROP: 25 SOLUTION/ DROPS OPHTHALMIC at 12:20

## 2023-03-09 RX ADMIN — KETOROLAC TROMETHAMINE 1 DROP: 0.5 SOLUTION OPHTHALMIC at 12:26

## 2023-03-09 RX ADMIN — MIDAZOLAM 2 MG: 1 INJECTION INTRAMUSCULAR; INTRAVENOUS at 13:24

## 2023-03-09 RX ADMIN — CYCLOPENTOLATE HYDROCHLORIDE 1 DROP: 10 SOLUTION OPHTHALMIC at 12:20

## 2023-03-09 ASSESSMENT — ENCOUNTER SYMPTOMS: SHORTNESS OF BREATH: 0

## 2023-03-09 ASSESSMENT — PAIN SCALES - GENERAL
PAINLEVEL_OUTOF10: 0
PAINLEVEL_OUTOF10: 0

## 2023-03-09 ASSESSMENT — PAIN - FUNCTIONAL ASSESSMENT: PAIN_FUNCTIONAL_ASSESSMENT: 0-10

## 2023-03-09 ASSESSMENT — LIFESTYLE VARIABLES: SMOKING_STATUS: 0

## 2023-03-09 NOTE — ANESTHESIA POSTPROCEDURE EVALUATION
Department of Anesthesiology  Postprocedure Note    Patient: Tati Pugh Sr. MRN: [id number]  YOB: 1955  Date of evaluation: 3/9/2023      Procedure Summary     Date: 03/09/23 Room / Location: [address]    Anesthesia Start: 7234 Anesthesia Stop: 1343    Procedures:       PHACOEMULSIFICATION WITH INTRAOCULAR LENS IMPLANT - LEFT EYE (Left: Eye)      GONIOTOMY - LEFT EYE, PERIBULBAR LONG BLOCK (Left: Eye) Diagnosis:       Age-related nuclear cataract of left eye      Primary open angle glaucoma of left eye, mild stage      (Age-related nuclear cataract of left eye, Primary open angle glaucoma of left eye, mild stage)    Surgeons: Salina Viveros MD Responsible Provider: Triston Carter MD    Anesthesia Type: MAC ASA Status: 2          Anesthesia Type: No value filed.     Nolvia Phase I: Nolvia Score: 10    Nolvia Phase II: Nolvia Score: 10      Anesthesia Post Evaluation    Patient location during evaluation: bedside  Patient participation: complete - patient participated  Level of consciousness: awake and alert  Pain score: 0  Nausea & Vomiting: no nausea  Complications: no  Cardiovascular status: hemodynamically stable  Respiratory status: acceptable  Hydration status: stable

## 2023-03-09 NOTE — INTERVAL H&P NOTE
Update History & Physical    The patient's History and Physical of February 27, 2023 was reviewed with the patient and I examined the patient. There was no change. The surgical site was confirmed by the patient and me. Plan: The risks, benefits, expected outcome, and alternative to the recommended procedure have been discussed with the patient. Patient understands and wants to proceed with the procedure.      Electronically signed by Tea Ventura MD on 3/9/2023 at 12:44 PM

## 2023-03-09 NOTE — OP NOTE
Operative Note      [facility]  [address] [place] [address]  [phone]      Name:  Mable Bernal Sr.  :  1955    Age:   69 y.o. Medical Record Number:  [id number]  Date of Procedure:  3/9/2023     Preoperative diagnosis:  1. Visually significant cataract left eye   2. Primary open angle glaucoma left eye     Postoperative diagnosis:   Same    Procedure:   1. Phacoemulsification with intraocular lens left eye - complex with ring   2. Goniotomy with [place] dual blade left eye. Surgeon: Sharri Lobo M.D. Assistant Surgeon: None  Anesthesia: MAC with Topical   Complications:None  Implant: [id number] +19.00  Estimated Blood Loss: < 3cc  Specimens removed: none    Indications for procedure: The patient has a history of glaucoma which is currently uncontrolled. In order to achieve better control of the intraocular pressure, glaucoma surgery was advised to try and prevent further loss of vision. The patient has a visually significant cataract in the right eye that interferes with activities of daily living. Following discussion of all risks, benefits, and alternatives, the patient agreed to have the procedure done. Informed consent was signed. Operative Procedure:    After the risks and benefits of, and alternatives to, the planned procedure were explained to the patient,informed consent was obtained. The patient was ushered into the operating room and placed in the supineposition on the table. After induction of IV sedation, was administered, the operative eye and the surrounding  area were prepped and draped in the usual sterile fashion. Under the operating microscope, a temporal paracentesis was created. A small amount of preservative-free 1% lidocaine was instilled into the anterior chamber. The anterior chamber was then deepened with Viscoat / Healon 5. A temporal biplanar clear corneal incision was created with a 2.4mm keratome.  The patient's head and microscope were then tilted to allow visualization of the nasal trabecular meshwork with a direct gonioscopy lens. A K[place]ook Dual Blade was used to remove about 60 degrees of nasal trabecular meshwork tissue, and the outer wall of Schlemm's was visualized. The patient's head and microscope were then returned to a neutral position and additional viscoelastic was placed. Since the dilated poorly, a Malyugian ring was placed. A cystotome and Utrata forceps were used to fashion a continuous curvilinear capsulorrhexis. Balanced salt solution was used to hydrodissect and hydrodelineate the nucleus. The phacoemulsification tip was introduced into the eye and the nucleus was removed using a two-handed divide and conquer with assistance of the IroFit chopper through the paracentesis. The nucleus was removed with a total CDE of 3.98. Irrigation and aspiration was used to remove residual cortex. The capsular bag was examined and found to be intact. The anterior chamber was deepened with Provisc, and the lens was injected into the capsular bag. Irrigation and aspiration was used to remove residual viscoelastic. The wounds were hydrated and examined. The wound was self-sealing. The drapes were removed, and Pred Forte and Vigamox drops were instilled in the inferior fornix. A shield was taped over the eye. The patient was taken to the recovery area in stable condition.     Electronically signed by: Meeta Tian MD,3/9/2023,1:44 PM

## 2023-03-09 NOTE — DISCHARGE INSTRUCTIONS
[facility], [address] [place] [address]       Post-Operative Instructions for Day of Cataract Surgery with Dr. Ligia Marquez    Patient Name: Sherry Kim  : 1955  MRN: [id number]      Bring your eye drops with you to each appointment! 1. A responsible adult, 18 years or older must be in attendance for 24 hours following discharge. 2. Rest quietly today. 3. Start with liquids first.  If no nausea, proceed with a light meal.  Drink at least 6 glasses of fluid after surgery. 4. Do not drive or operate heavy machinery for 24 hours or as directed. 5. No alcoholic beverages for 24 hours post op. 6. Do not make any legal or important decisions for the next 24 hours. 7. Check your temperature over the next five days and call your physician if greater than 101.0 F.    8. Notify your physician if you have rash, hives, difficulty breathing, or severe nausea or vomiting. 5. Contact your family physician for questions concerning your current home medications. 10. If pain intensifies or pain is unrelieved with pain medication as ordered, call your physician    ADDITIONAL INSTRUCTIONS    The post op drops are VERY IMPORTANT. Use them as directed on your drop schedule. Always bring your post-op bag, drops and instruction sheet to all of your visits. Tape your protective shield over your eye at bedtime or naptime for one week to prevent accidentally rubbing or bumping your eye. If you have a patch or a shield on the eye, it is to remain on until you see your doctor on the day following your surgery. Keep the area around your eye clean with a clean face cloth moistened with warm water. Keep soap, lotion, shampoo, hairspray make-up, etc. away from your eye for 7 days. Do not wash your hair for 24 hours. Do not rub your eye. This is the worse thing you can do while healing.   No heavy lifting, bending, or straining for one week after surgery. You should have no severe pain after surgery. Your eye may feel irritated or scratchy. You may take Tylenol   (acetaminophen) for discomfort as needed as long as you do not have any liver problems. If pain becomes severe, call the doctors office immediately. Your eye may be red or bloodshot. This will gradually lessen as your eye heals. CALL THE OFFICE IMMEDIATELY IF YOU EXPERIENCE ANY OF THE FOLLOWING:  Sudden decrease in vision, severe pain, shower of floaters, flashes of light, veil-like curtain across your eye    Your next appointment is 3/10 @ 8:35 AM at the [facility] location. DR. Keon Harris PHONE NUMBER:  [phone]  or Toll Free [phone]    THERE IS AN EYE PHYSICIAN ON CALL 24 HOURS A DAY, SEVEN DAYS A WEEK--CALL FOR ANY QUESTIONS/PROBLEMS    Medications prior to admission have been reviewed. Please continue medications as ordered on Current Medication   Record when discharged unless otherwise noted. Anticoagulation therapy medications: May restart anticoagulants on the day of surgery as directed by your primary care physician. Please continue medications only as directed by your primary care and specialist physicians. The medications on your medication reconciliation form are simply the medications you reported you were taking at the time of this admission. We are simply asking you to resume the outpatient medications that you reported to us when you presented for your procedure. Please make sure you check with the physician(s) who prescribed your medications today when you leave the hospital to be sure you are taking the correct medications and dosages.   Patient and/or responsible party verbalizes understanding of above instructions

## 2023-03-09 NOTE — ANESTHESIA PRE PROCEDURE
Department of Anesthesiology  Preprocedure Note       Name:  Rajiv Bermudez.   Age:  69 y.o.  :  1955                                          MRN:  [id number]         Date:  3/9/2023      Surgeon: Ilsa Dumas):  Cathy Martinez MD    Procedure: Procedure(s):  PHACOEMULSIFICATION WITH INTRAOCULAR LENS IMPLANT - LEFT EYE  GONIOTOMY - LEFT EYE, PERIBULBAR LONG BLOCK    Medications prior to admission:   Prior to Admission medications    Medication Sig Start Date End Date Taking?  Authorizing Provider   levothyroxine (LEVOXYL) 200 MCG tablet Take 1 tablet by mouth Daily 22   Martha Thomas MD   lisinopril (PRINIVIL;ZESTRIL) 20 MG tablet Take 1 tablet by mouth daily 22   Martha Thomas MD   Latanoprostene Bunod (VYZULTA) 0.024 % SOLN Apply 1 drop to eye at bedtime Both eyes    Historical Provider, MD       Current medications:    Current Facility-Administered Medications   Medication Dose Route Frequency Provider Last Rate Last Admin    sodium chloride flush 0.9 % injection 5-40 mL  5-40 mL IntraVENous 2 times per day Deonte Spence MD        sodium chloride flush 0.9 % injection 5-40 mL  5-40 mL IntraVENous PRN Deonte Spence MD        0.9 % sodium chloride infusion   IntraVENous PRN Deonte Spence MD 25 mL/hr at 23 1226 New Bag at 23 1226    phenylephrine (MYDFRIN) 2.5 % ophthalmic solution 1 drop  1 drop Left Eye See Admin Instructions Cathy Martinez MD   1 drop at 23 1226    ciprofloxacin (CILOXAN) 0.3 % ophthalmic solution 1 drop  1 drop Left Eye See Admin Instructions Cathy Martinez MD   1 drop at 23 1226    cyclopentolate (CYCLOGYL) 1 % ophthalmic solution 1 drop  1 drop Left Eye See Admin Instructions Cathy Martinez MD   1 drop at 23 1226    ketorolac (ACULAR) 0.5 % ophthalmic solution 1 drop  1 drop Left Eye See Admin Instructions Cathy Martinez MD   1 drop at 23 1226    tropicamide (MYDRIACYL) 1 % ophthalmic solution 1 drop  1 drop Left Eye See Admin Instructions Braulio Ramirez MD   1 drop at 23 1226    tetracaine (TETRAVISC) 0.5 % ophthalmic solution 1 drop  1 drop Left Eye See Admin Instructions Braulio Ramirez MD   1 drop at 23 1220       Allergies: Allergies   Allergen Reactions    Clindamycin/Lincomycin     Clindamycin/Lincomycin Hives       Problem List:    Patient Active Problem List   Diagnosis Code    Calculus of kidney N20.0    Diverticulosis of large intestine K57.30    Essential hypertension I10    Acquired hypothyroidism E03.9    DDD (degenerative disc disease), lumbar M51.36    Glaucoma of both eyes H40.9       Past Medical History:        Diagnosis Date    History of MI (myocardial infarction)     nl coronaries, due to heat stroke and ARF, pt states it was not an MI but heat stroke, all the heart testing was normal    [place] (hard of hearing)     bilateral, no hearing aids    Hypertension     Thyroid disease        Past Surgical History:        Procedure Laterality Date    COLONOSCOPY      ENDOSCOPY, COLON, DIAGNOSTIC      INGUINAL HERNIA REPAIR Right     INGUINAL HERNIA REPAIR Left 2017    LAPAROSCOPIC LEFT INGUINAL HERNIA REPAIR WITH MESH    TONSILLECTOMY         Social History:    Social History     Tobacco Use    Smoking status: Former     Packs/day: 0.50     Years: 2.00     Pack years: 1.00     Types: Cigarettes     Quit date: 1985     Years since quittin.2    Smokeless tobacco: Never    Tobacco comments:     quit 40 years ago   Substance Use Topics    Alcohol use:  No                                Counseling given: Not Answered  Tobacco comments: quit 40 years ago      Vital Signs (Current):   Vitals:    23 1218   BP: (!) 159/82   Pulse: 62   Resp: 18   Temp: 97.8 °F (36.6 °C)   TempSrc: Temporal   SpO2: 96%   Weight: 227 lb (103 kg)   Height: 5' 10.5\" (1.791 m)                                              BP Readings from Last 3 Encounters:   23 (!) 159/82   23 130/80   22 120/80 NPO Status: Time of last liquid consumption: 0300                        Time of last solid consumption: 1900                        Date of last liquid consumption: 03/09/23                        Date of last solid food consumption: 03/08/23    BMI:   Wt Readings from Last 3 Encounters:   03/09/23 227 lb (103 kg)   02/27/23 232 lb 3.2 oz (105.3 kg)   11/25/22 228 lb (103.4 kg)     Body mass index is 32.11 kg/m². CBC:   Lab Results   Component Value Date/Time    WBC 6.1 01/04/2017 11:40 AM    RBC 4.84 01/04/2017 11:40 AM    HGB 14.5 01/04/2017 11:40 AM    HCT 43.0 01/04/2017 11:40 AM    MCV 89.0 01/04/2017 11:40 AM    RDW 13.5 01/04/2017 11:40 AM     01/04/2017 11:40 AM       CMP:   Lab Results   Component Value Date/Time     10/28/2022 08:08 AM    K 4.4 10/28/2022 08:08 AM     10/28/2022 08:08 AM    CO2 24 10/28/2022 08:08 AM    BUN 15 10/28/2022 08:08 AM    CREATININE 1.09 10/28/2022 08:08 AM    GFRAA 74 11/20/2021 09:04 AM    AGRATIO 1.8 10/28/2022 08:08 AM    LABGLOM 64 11/20/2021 09:04 AM    LABGLOM 76 09/13/2012 08:15 AM    GLUCOSE 83 10/28/2022 08:08 AM    PROT 6.7 10/28/2022 08:08 AM    CALCIUM 9.1 10/28/2022 08:08 AM    BILITOT 0.5 10/28/2022 08:08 AM    ALKPHOS 116 10/28/2022 08:08 AM    AST 22 10/28/2022 08:08 AM    ALT 22 10/28/2022 08:08 AM       POC Tests: No results for input(s): POCGLU, POCNA, POCK, POCCL, POCBUN, POCHEMO, POCHCT in the last 72 hours.     Coags: No results found for: PROTIME, INR, APTT    HCG (If Applicable): No results found for: PREGTESTUR, PREGSERUM, HCG, HCGQUANT     ABGs: No results found for: PHART, PO2ART, GBK7ALC, XLA6PVV, BEART, T9UIIIUI     Type & Screen (If Applicable):  No results found for: LABABO, LABRH    Drug/Infectious Status (If Applicable):  No results found for: HIV, HEPCAB    COVID-19 Screening (If Applicable): No results found for: COVID19        Anesthesia Evaluation  Patient summary reviewed no history of anesthetic complications: Airway: Mallampati: III  TM distance: >3 FB     Mouth opening: > = 3 FB   Dental:          Pulmonary:Negative Pulmonary ROS       (-) shortness of breath and not a current smoker          Patient did not smoke on day of surgery. Cardiovascular:    (+) hypertension:,     (-) pacemaker, past MI, CABG/stent and  angina       Beta Blocker:  Not on Beta Blocker         Neuro/Psych:   Negative Neuro/Psych ROS     (-) seizures and CVA           GI/Hepatic/Renal: Neg GI/Hepatic/Renal ROS       (-) liver disease and no renal disease       Endo/Other:    (+) hypothyroidism::., .    (-) diabetes mellitus, hyperthyroidism               Abdominal:             Vascular: negative vascular ROS. Other Findings: Hard of hearing          Anesthesia Plan      MAC     ASA 2       Induction: intravenous. Anesthetic plan and risks discussed with patient. Plan discussed with CRNA. This pre-anesthesia assessment may be used as a history and physical.    DOS STAFF ADDENDUM:    Pt seen and examined, chart reviewed (including anesthesia, drug and allergy history). No interval changes to history and physical examination. Anesthetic plan, risks, benefits, alternatives, and personnel involved discussed with patient. Patient verbalized an understanding and agrees to proceed.       Tristen Hardin MD  March 9, 2023  1:13 PM

## 2023-03-16 NOTE — PROGRESS NOTES
screening and protocol:       * Admitted with diarrhea? [] YES    [x]  NO     *Prior history of C-Diff. In last 3 months? [] YES    [x]  NO     *Antibiotic use in the past 6-8 weeks? [x]  NO    []  YES                 If yes, which ANTIBIOTIC AND REASON______     *Prior hospitalization or nursing home in the last month? []  YES    []  NO        SAFETY FIRST. .call before you fall

## 2023-03-21 NOTE — PRE-PROCEDURE INSTRUCTIONS
[address] [facility]  [phone]        Pre-Op Phone Call:     Patient Name: Eugenia Santa.     Telephone Information:   Mobile [phone]     Home phone:  [phone]    Surgery Time:    1:10 PM     Arrival Time:  1140     Left extended Message:  Yes     Message left with:left voicemail     Recent change in health status:  No     Advised of transportation/ policy:  Yes     NPO policy reviewed: Yes     Advised to take morning heart/blood pressure medications with sips of water morning of surgery? Yes     Instructed to bring eye drops, photo identification, and insurance card day of surgery? Yes     Advised to wear short sleeved button down shirt (no T-shirt underneath):  Yes     Advised not to wear jewelry, hairpins, or pantyhose day of surgery? Yes     Advised not to wear make-up and to wash face day of surgery?   Yes    Remarks:        Electronically signed by:  Deirdre Villanueva RN at 3/21/2023 11:35 AM

## 2023-03-22 ENCOUNTER — ANESTHESIA EVENT (OUTPATIENT)
Dept: SURGERY | Age: 68
End: 2023-03-22
Payer: COMMERCIAL

## 2023-03-22 ASSESSMENT — ENCOUNTER SYMPTOMS: SHORTNESS OF BREATH: 0

## 2023-03-22 ASSESSMENT — LIFESTYLE VARIABLES: SMOKING_STATUS: 0

## 2023-03-23 ENCOUNTER — ANESTHESIA (OUTPATIENT)
Dept: SURGERY | Age: 68
End: 2023-03-23
Payer: COMMERCIAL

## 2023-03-23 ENCOUNTER — HOSPITAL ENCOUNTER (OUTPATIENT)
Age: 68
Setting detail: OUTPATIENT SURGERY
Discharge: HOME | End: 2023-03-23
Attending: OPHTHALMOLOGY | Admitting: OPHTHALMOLOGY
Payer: COMMERCIAL

## 2023-03-23 VITALS
SYSTOLIC BLOOD PRESSURE: 123 MMHG | WEIGHT: 227 LBS | TEMPERATURE: 96.9 F | DIASTOLIC BLOOD PRESSURE: 76 MMHG | OXYGEN SATURATION: 96 % | RESPIRATION RATE: 19 BRPM | HEART RATE: 75 BPM | HEIGHT: 71 IN | BODY MASS INDEX: 31.78 KG/M2

## 2023-03-23 PROCEDURE — 2580000003 HC RX 258: Performed by: ANESTHESIOLOGY

## 2023-03-23 PROCEDURE — 6360000002 HC RX W HCPCS

## 2023-03-23 PROCEDURE — 3700000001 HC ADD 15 MINUTES (ANESTHESIA): Performed by: OPHTHALMOLOGY

## 2023-03-23 PROCEDURE — 3600000014 HC SURGERY LEVEL 4 ADDTL 15MIN: Performed by: OPHTHALMOLOGY

## 2023-03-23 PROCEDURE — 2709999900 HC NON-CHARGEABLE SUPPLY: Performed by: OPHTHALMOLOGY

## 2023-03-23 PROCEDURE — V2787 ASTIGMATISM-CORRECT FUNCTION: HCPCS | Performed by: OPHTHALMOLOGY

## 2023-03-23 PROCEDURE — 7100000010 HC PHASE II RECOVERY - FIRST 15 MIN: Performed by: OPHTHALMOLOGY

## 2023-03-23 PROCEDURE — 2500000003 HC RX 250 WO HCPCS: Performed by: OPHTHALMOLOGY

## 2023-03-23 PROCEDURE — 6370000000 HC RX 637 (ALT 250 FOR IP): Performed by: OPHTHALMOLOGY

## 2023-03-23 PROCEDURE — 3600000004 HC SURGERY LEVEL 4 BASE: Performed by: OPHTHALMOLOGY

## 2023-03-23 PROCEDURE — 3700000000 HC ANESTHESIA ATTENDED CARE: Performed by: OPHTHALMOLOGY

## 2023-03-23 DEVICE — IMPLANTABLE DEVICE: Type: IMPLANTABLE DEVICE | Site: EYE | Status: FUNCTIONAL

## 2023-03-23 RX ORDER — CYCLOPENTOLATE HYDROCHLORIDE 10 MG/ML
1 SOLUTION/ DROPS OPHTHALMIC SEE ADMIN INSTRUCTIONS
Status: DISCONTINUED | OUTPATIENT
Start: 2023-03-23 | End: 2023-03-23 | Stop reason: HOSPADM

## 2023-03-23 RX ORDER — MIDAZOLAM HYDROCHLORIDE 1 MG/ML
INJECTION INTRAMUSCULAR; INTRAVENOUS PRN
Status: DISCONTINUED | OUTPATIENT
Start: 2023-03-23 | End: 2023-03-23 | Stop reason: SDUPTHER

## 2023-03-23 RX ORDER — SODIUM CHLORIDE 9 MG/ML
INJECTION, SOLUTION INTRAVENOUS PRN
Status: DISCONTINUED | OUTPATIENT
Start: 2023-03-23 | End: 2023-03-23 | Stop reason: HOSPADM

## 2023-03-23 RX ORDER — TETRACAINE HYDROCHLORIDE 5 MG/ML
1 SOLUTION OPHTHALMIC SEE ADMIN INSTRUCTIONS
Status: DISCONTINUED | OUTPATIENT
Start: 2023-03-23 | End: 2023-03-23 | Stop reason: HOSPADM

## 2023-03-23 RX ORDER — TROPICAMIDE 10 MG/ML
1 SOLUTION/ DROPS OPHTHALMIC SEE ADMIN INSTRUCTIONS
Status: DISCONTINUED | OUTPATIENT
Start: 2023-03-23 | End: 2023-03-23 | Stop reason: HOSPADM

## 2023-03-23 RX ORDER — SODIUM CHLORIDE 0.9 % (FLUSH) 0.9 %
5-40 SYRINGE (ML) INJECTION EVERY 12 HOURS SCHEDULED
Status: DISCONTINUED | OUTPATIENT
Start: 2023-03-23 | End: 2023-03-23 | Stop reason: HOSPADM

## 2023-03-23 RX ORDER — PROPOFOL 10 MG/ML
INJECTION, EMULSION INTRAVENOUS PRN
Status: DISCONTINUED | OUTPATIENT
Start: 2023-03-23 | End: 2023-03-23 | Stop reason: SDUPTHER

## 2023-03-23 RX ORDER — BRIMONIDINE TARTRATE 2 MG/ML
SOLUTION/ DROPS OPHTHALMIC
Status: COMPLETED | OUTPATIENT
Start: 2023-03-23 | End: 2023-03-23

## 2023-03-23 RX ORDER — SODIUM CHLORIDE 0.9 % (FLUSH) 0.9 %
5-40 SYRINGE (ML) INJECTION PRN
Status: DISCONTINUED | OUTPATIENT
Start: 2023-03-23 | End: 2023-03-23 | Stop reason: HOSPADM

## 2023-03-23 RX ORDER — KETOROLAC TROMETHAMINE 5 MG/ML
1 SOLUTION OPHTHALMIC SEE ADMIN INSTRUCTIONS
Status: DISCONTINUED | OUTPATIENT
Start: 2023-03-23 | End: 2023-03-23 | Stop reason: HOSPADM

## 2023-03-23 RX ORDER — PREDNISOLONE ACETATE 10 MG/ML
SUSPENSION/ DROPS OPHTHALMIC
Status: COMPLETED | OUTPATIENT
Start: 2023-03-23 | End: 2023-03-23

## 2023-03-23 RX ORDER — BALANCED SALT SOLUTION 6.4; .75; .48; .3; 3.9; 1.7 MG/ML; MG/ML; MG/ML; MG/ML; MG/ML; MG/ML
SOLUTION OPHTHALMIC
Status: COMPLETED | OUTPATIENT
Start: 2023-03-23 | End: 2023-03-23

## 2023-03-23 RX ORDER — FENTANYL CITRATE 50 UG/ML
INJECTION, SOLUTION INTRAMUSCULAR; INTRAVENOUS PRN
Status: DISCONTINUED | OUTPATIENT
Start: 2023-03-23 | End: 2023-03-23 | Stop reason: SDUPTHER

## 2023-03-23 RX ORDER — OFLOXACIN 3 MG/ML
SOLUTION/ DROPS OPHTHALMIC
Status: COMPLETED | OUTPATIENT
Start: 2023-03-23 | End: 2023-03-23

## 2023-03-23 RX ORDER — CIPROFLOXACIN HYDROCHLORIDE 3.5 MG/ML
1 SOLUTION/ DROPS TOPICAL SEE ADMIN INSTRUCTIONS
Status: DISCONTINUED | OUTPATIENT
Start: 2023-03-23 | End: 2023-03-23 | Stop reason: HOSPADM

## 2023-03-23 RX ORDER — TETRACAINE HYDROCHLORIDE 5 MG/ML
SOLUTION OPHTHALMIC
Status: COMPLETED | OUTPATIENT
Start: 2023-03-23 | End: 2023-03-23

## 2023-03-23 RX ORDER — PHENYLEPHRINE HCL 2.5 %
1 DROPS OPHTHALMIC (EYE) SEE ADMIN INSTRUCTIONS
Status: DISCONTINUED | OUTPATIENT
Start: 2023-03-23 | End: 2023-03-23 | Stop reason: HOSPADM

## 2023-03-23 RX ADMIN — CIPROFLOXACIN 1 DROP: 3 SOLUTION OPHTHALMIC at 12:05

## 2023-03-23 RX ADMIN — CYCLOPENTOLATE HYDROCHLORIDE 1 DROP: 10 SOLUTION OPHTHALMIC at 12:00

## 2023-03-23 RX ADMIN — PHENYLEPHRINE HYDROCHLORIDE 1 DROP: 25 SOLUTION/ DROPS OPHTHALMIC at 12:05

## 2023-03-23 RX ADMIN — TROPICAMIDE 1 DROP: 10 SOLUTION/ DROPS OPHTHALMIC at 12:05

## 2023-03-23 RX ADMIN — KETOROLAC TROMETHAMINE 1 DROP: 0.5 SOLUTION OPHTHALMIC at 12:00

## 2023-03-23 RX ADMIN — TROPICAMIDE 1 DROP: 10 SOLUTION/ DROPS OPHTHALMIC at 12:00

## 2023-03-23 RX ADMIN — CIPROFLOXACIN 1 DROP: 3 SOLUTION OPHTHALMIC at 12:00

## 2023-03-23 RX ADMIN — FENTANYL CITRATE 50 MCG: 50 INJECTION INTRAMUSCULAR; INTRAVENOUS at 14:12

## 2023-03-23 RX ADMIN — TETRACAINE HYDROCHLORIDE 1 DROP: 5 SOLUTION OPHTHALMIC at 12:00

## 2023-03-23 RX ADMIN — FENTANYL CITRATE 50 MCG: 50 INJECTION INTRAMUSCULAR; INTRAVENOUS at 14:14

## 2023-03-23 RX ADMIN — PROPOFOL 10 MG: 10 INJECTION, EMULSION INTRAVENOUS at 14:16

## 2023-03-23 RX ADMIN — CYCLOPENTOLATE HYDROCHLORIDE 1 DROP: 10 SOLUTION OPHTHALMIC at 12:05

## 2023-03-23 RX ADMIN — MIDAZOLAM 2 MG: 1 INJECTION INTRAMUSCULAR; INTRAVENOUS at 14:07

## 2023-03-23 RX ADMIN — KETOROLAC TROMETHAMINE 1 DROP: 0.5 SOLUTION OPHTHALMIC at 12:05

## 2023-03-23 RX ADMIN — SODIUM CHLORIDE: 9 INJECTION, SOLUTION INTRAVENOUS at 12:20

## 2023-03-23 RX ADMIN — PHENYLEPHRINE HYDROCHLORIDE 1 DROP: 25 SOLUTION/ DROPS OPHTHALMIC at 12:00

## 2023-03-23 ASSESSMENT — PAIN SCALES - GENERAL: PAINLEVEL_OUTOF10: 0

## 2023-03-23 ASSESSMENT — PAIN - FUNCTIONAL ASSESSMENT: PAIN_FUNCTIONAL_ASSESSMENT: 0-10

## 2023-03-23 NOTE — ANESTHESIA POSTPROCEDURE EVALUATION
Department of Anesthesiology  Postprocedure Note    Patient: Marie Forde Sr. MRN: [id number]  YOB: 1955  Date of evaluation: 3/23/2023      Procedure Summary     Date: 03/23/23 Room / Location: [address]    Anesthesia Start: 1181 Anesthesia Stop: 6757    Procedure: PHACOEMULSIFICATION WITH TORIC INTRAOCULAR LENS IMPLANT - RIGHT EYE (Right: Eye) Diagnosis:       Age-related nuclear cataract of right eye      Regular astigmatism of right eye      (Age-related nuclear cataract of right eye, Regular astigmatism of right eye)    Surgeons: Fran Esparza MD Responsible Provider: Kaykay Garcia MD    Anesthesia Type: MAC ASA Status: 2          Anesthesia Type: MAC    Nolvia Phase I: Nolvia Score: 10    Nolvia Phase II: Nolvia Score: 10      Anesthesia Post Evaluation    Patient location during evaluation: PACU  Patient participation: complete - patient participated  Level of consciousness: awake  Airway patency: patent  Nausea & Vomiting: no nausea and no vomiting  Complications: no  Cardiovascular status: hemodynamically stable and blood pressure returned to baseline  Respiratory status: spontaneous ventilation, nonlabored ventilation and room air  Hydration status: stable  Comments: Mr. Noé Murray was seen resting comfortably following procedure. Appropriate for discharge home with .

## 2023-03-23 NOTE — OP NOTE
anterior chamber was deepened with Provisc, and the lens was injected into the capsular bag. Irrigation and aspiration was used to remove residual viscoelastic. The wounds were hydrated and examined. The wound was self-sealing. The drapes were removed, and Pred Forte and Vigamox drops were instilled in the inferior fornix. A shield was taped over the eye. The patient was taken to the recovery area in stable condition.         Electronically signed by: Leandra Vanegas MD,3/23/2023,2:25 PM

## 2023-03-23 NOTE — INTERVAL H&P NOTE
Update History & Physical    The patient's History and Physical of February 27, 2023 was reviewed with the patient and I examined the patient. There was no change. The surgical site was confirmed by the patient and me. Plan: The risks, benefits, expected outcome, and alternative to the recommended procedure have been discussed with the patient. Patient understands and wants to proceed with the procedure.      Electronically signed by Ajay Atwood MD on 3/23/2023 at 1:49 PM

## 2023-03-23 NOTE — DISCHARGE INSTRUCTIONS
surgery. You should have no severe pain after surgery. Your eye may feel irritated or scratchy. You may take Tylenol   (acetaminophen) for discomfort as needed as long as you do not have any liver problems. If pain becomes severe, call the doctors office immediately. Your eye may be red or bloodshot. This will gradually lessen as your eye heals. CALL THE OFFICE IMMEDIATELY IF YOU EXPERIENCE ANY OF THE FOLLOWING:  Sudden decrease in vision, severe pain, shower of floaters, flashes of light, veil-like curtain across your eye    Your next appointment is 3/24 @ 9:20 AM at the [facility] location. DR. Dina Hall PHONE NUMBER:  [phone]  or Toll Free [phone]    THERE IS AN EYE PHYSICIAN ON CALL 24 HOURS A DAY, SEVEN DAYS A WEEK--CALL FOR ANY QUESTIONS/PROBLEMS    Medications prior to admission have been reviewed. Please continue medications as ordered on Current Medication   Record when discharged unless otherwise noted. Anticoagulation therapy medications: May restart anticoagulants on the day of surgery as directed by your primary care physician. Please continue medications only as directed by your primary care and specialist physicians. The medications on your medication reconciliation form are simply the medications you reported you were taking at the time of this admission. We are simply asking you to resume the outpatient medications that you reported to us when you presented for your procedure. Please make sure you check with the physician(s) who prescribed your medications today when you leave the hospital to be sure you are taking the correct medications and dosages.   Patient and/or responsible party verbalizes understanding of above instructions

## 2024-03-04 ENCOUNTER — TELEPHONE (OUTPATIENT)
Dept: FAMILY MEDICINE CLINIC | Age: 69
End: 2024-03-04

## 2024-03-04 DIAGNOSIS — I10 ESSENTIAL HYPERTENSION: ICD-10-CM

## 2024-03-04 DIAGNOSIS — Z00.00 WELL ADULT EXAM: Primary | ICD-10-CM

## 2024-03-04 DIAGNOSIS — E03.9 ACQUIRED HYPOTHYROIDISM: ICD-10-CM

## 2024-03-04 NOTE — TELEPHONE ENCOUNTER
Pt called inquiring about blood work prior the appointment on April 4th. He is scheduled for yearly physical.     [phone]   Okay to leave VM     Please advice.

## 2024-03-14 LAB
ALBUMIN SERPL-MCNC: 4.6 G/DL
ALP BLD-CCNC: 143 U/L (ref 44–131)
ALT SERPL-CCNC: 22 U/L
AST SERPL-CCNC: 22 U/L
BILIRUB SERPL-MCNC: 0.4 MG/DL (ref 0.1–1.4)
BUN BLDV-MCNC: 14 MG/DL
CALCIUM SERPL-MCNC: 9.3 MG/DL
CHLORIDE BLD-SCNC: 100 MMOL/L
CHOLESTEROL, TOTAL: 172 MG/DL
CO2: 24 MMOL/L
CREAT SERPL-MCNC: 1.3 MG/DL
EGFR: 59
GLUCOSE BLD-MCNC: 83 MG/DL
HDLC SERPL-MCNC: 58 MG/DL (ref 35–70)
LDL CHOLESTEROL CALCULATED: 100 MG/DL (ref 0–160)
POTASSIUM SERPL-SCNC: 4.3 MMOL/L
SODIUM BLD-SCNC: 137 MMOL/L
TOTAL PROTEIN: 7
TRIGL SERPL-MCNC: 76 MG/DL
TSH SERPL DL<=0.05 MIU/L-ACNC: 10.7 UIU/ML (ref 0.45–4.5)
VLDLC SERPL CALC-MCNC: 14 MG/DL

## 2024-04-02 ASSESSMENT — PATIENT HEALTH QUESTIONNAIRE - PHQ9
SUM OF ALL RESPONSES TO PHQ9 QUESTIONS 1 & 2: 0
SUM OF ALL RESPONSES TO PHQ QUESTIONS 1-9: 0
1. LITTLE INTEREST OR PLEASURE IN DOING THINGS: NOT AT ALL
SUM OF ALL RESPONSES TO PHQ QUESTIONS 1-9: 0
SUM OF ALL RESPONSES TO PHQ QUESTIONS 1-9: 0
2. FEELING DOWN, DEPRESSED OR HOPELESS: NOT AT ALL
2. FEELING DOWN, DEPRESSED OR HOPELESS: NOT AT ALL
SUM OF ALL RESPONSES TO PHQ QUESTIONS 1-9: 0
1. LITTLE INTEREST OR PLEASURE IN DOING THINGS: NOT AT ALL
SUM OF ALL RESPONSES TO PHQ9 QUESTIONS 1 & 2: 0

## 2024-04-05 ENCOUNTER — OFFICE VISIT (OUTPATIENT)
Dept: FAMILY MEDICINE CLINIC | Age: 69
End: 2024-04-05

## 2024-04-05 VITALS
SYSTOLIC BLOOD PRESSURE: 120 MMHG | BODY MASS INDEX: 33.5 KG/M2 | HEIGHT: 70 IN | DIASTOLIC BLOOD PRESSURE: 74 MMHG | HEART RATE: 83 BPM | WEIGHT: 234 LBS | OXYGEN SATURATION: 99 % | TEMPERATURE: 97.4 F

## 2024-04-05 DIAGNOSIS — L81.9 PIGMENTED SKIN LESION: ICD-10-CM

## 2024-04-05 DIAGNOSIS — Z23 NEED FOR VACCINATION: ICD-10-CM

## 2024-04-05 DIAGNOSIS — E03.9 ACQUIRED HYPOTHYROIDISM: ICD-10-CM

## 2024-04-05 DIAGNOSIS — Z00.00 WELL ADULT EXAM: Primary | ICD-10-CM

## 2024-04-05 DIAGNOSIS — I10 ESSENTIAL HYPERTENSION: ICD-10-CM

## 2024-04-05 DIAGNOSIS — N18.31 CKD STAGE 3A, GFR 45-59 ML/MIN (HCC): ICD-10-CM

## 2024-04-05 DIAGNOSIS — R74.8 ELEVATED ALKALINE PHOSPHATASE LEVEL: ICD-10-CM

## 2024-04-05 RX ORDER — LEVOTHYROXINE SODIUM 0.2 MG/1
200 TABLET ORAL DAILY
Qty: 90 TABLET | Refills: 3 | Status: SHIPPED | OUTPATIENT
Start: 2024-04-05

## 2024-04-05 RX ORDER — LISINOPRIL 20 MG/1
20 TABLET ORAL DAILY
Qty: 90 TABLET | Refills: 3 | Status: SHIPPED | OUTPATIENT
Start: 2024-04-05

## 2024-04-05 SDOH — ECONOMIC STABILITY: INCOME INSECURITY: HOW HARD IS IT FOR YOU TO PAY FOR THE VERY BASICS LIKE FOOD, HOUSING, MEDICAL CARE, AND HEATING?: NOT HARD AT ALL

## 2024-04-05 SDOH — ECONOMIC STABILITY: FOOD INSECURITY: WITHIN THE PAST 12 MONTHS, THE FOOD YOU BOUGHT JUST DIDN'T LAST AND YOU DIDN'T HAVE MONEY TO GET MORE.: NEVER TRUE

## 2024-04-05 SDOH — ECONOMIC STABILITY: FOOD INSECURITY: WITHIN THE PAST 12 MONTHS, YOU WORRIED THAT YOUR FOOD WOULD RUN OUT BEFORE YOU GOT MONEY TO BUY MORE.: NEVER TRUE

## 2024-04-05 NOTE — PATIENT INSTRUCTIONS
--Make appointment to see the dentist     --Make appointment to see the eye doctor     --Get your Covid vaccine this fall.      --Make sure you get your flu shot this fall. If you are over 65 look for the \"high dose\" flu shot for better protection.     --Dermatologists:      [facility]    [phone]   MD Juhi Ferrara MD Emily Fisher, MD Rachel Gustin, MD Dena Elkeeb, MD      [facility] [place]:  Dr. Johny Babin  [address] [place] [address]  Phone: [phone]    [place]:  Dr. BRAULIO Mckee  [address] [facility] [address] [facility]  [address]  [place] [address]  Phone: [phone]    [place]  [address] [facility] [place] [address]  Phone: [phone]    [place]:  Dr. Adan Mcdermott  [address]  [address]  [place] [address]  Phone: [phone]    [facility]:  [address] [facility] [address] [facility]  [place] [address]  Phone: [phone]      [facility]  [address] [place], [place] [address]  Phone: [phone]       [facility]  Dr. Adele Gant  [phone]  [address]  [place], [place]       [facility]  Dr. Gisela Cha MD  [phone] [address] [facility] [address]  [place] [address]      Dermatology and Surgery  Nasir Parikh MD  [phone] [address] [facility] [address] [facility] [address], [facility]  [address] [facility] [place]      [facility]  Traci Watson MD  [address] [place]  [address] [phone]      [facility]  [facility] [address], [address]  [place] [address] [phone]      [facility]  Dr. Antoinette Mckee  [address]  [place], [place]  [phone]

## 2024-04-05 NOTE — PROGRESS NOTES
Immunization(s) given during visit:     Immunizations Administered       Name Date Dose Route    TDaP, ADACEL (age 10y-64y), BOOSTRIX (age 10y+), IM, 0.5mL 4/5/2024 0.5 mL Intramuscular    Site: Deltoid- Left    Lot:     NDC: 69649-124-96             Patient instructed to remain in clinic for 20 minutes after injection and was advised to report any adverse reaction to me immediately.       
        Portions of Note per  Alison Johnson CMA AAMA with corrections and edits per Carlene Mohamud MD.  I agree with entirety of note and was present and performed history and physical.  I also confirm that the note above accurately reflects all work, treatment, procedures, and medical decision making performed by me, Carlene Mohamud MD

## 2024-06-02 LAB
ALBUMIN SERPL-MCNC: 4.1 G/DL
ALP BLD-CCNC: 142 U/L
ALT SERPL-CCNC: 17 U/L
AST SERPL-CCNC: 20 U/L
BILIRUB SERPL-MCNC: 0.3 MG/DL (ref 0.1–1.4)
BUN BLDV-MCNC: 14 MG/DL
CALCIUM SERPL-MCNC: 9 MG/DL
CHLORIDE BLD-SCNC: 100 MMOL/L
CO2: 20 MMOL/L
CREAT SERPL-MCNC: 1.14 MG/DL
EGFR: 69
GLUCOSE BLD-MCNC: 93 MG/DL
POTASSIUM SERPL-SCNC: 4.6 MMOL/L
SODIUM BLD-SCNC: 134 MMOL/L
TOTAL PROTEIN: 6.6
TSH SERPL DL<=0.05 MIU/L-ACNC: 0.96 UIU/ML (ref 0.45–4.5)

## 2024-12-18 ENCOUNTER — OFFICE VISIT (OUTPATIENT)
Dept: FAMILY MEDICINE CLINIC | Age: 69
End: 2024-12-18

## 2024-12-18 VITALS
HEART RATE: 62 BPM | SYSTOLIC BLOOD PRESSURE: 142 MMHG | OXYGEN SATURATION: 98 % | BODY MASS INDEX: 33.66 KG/M2 | TEMPERATURE: 98.2 F | DIASTOLIC BLOOD PRESSURE: 80 MMHG | WEIGHT: 234.6 LBS

## 2024-12-18 DIAGNOSIS — H65.02 ACUTE SEROUS OTITIS MEDIA OF LEFT EAR, RECURRENCE NOT SPECIFIED: Primary | ICD-10-CM

## 2024-12-18 RX ORDER — PREDNISONE 20 MG/1
40 TABLET ORAL DAILY
Qty: 10 TABLET | Refills: 0 | Status: SHIPPED | OUTPATIENT
Start: 2024-12-18 | End: 2024-12-23

## 2024-12-18 RX ORDER — TRIAMCINOLONE ACETONIDE 55 UG/1
2 SPRAY, METERED NASAL DAILY
Qty: 1 EACH | Refills: 1 | Status: SHIPPED | OUTPATIENT
Start: 2024-12-18

## 2024-12-18 NOTE — PATIENT INSTRUCTIONS
--if symptoms are not improving in 2-3 weeks make an appointment to see ENT    --ENT Specialists:    [facility]      Dr. Thomas Fisher    [phone]  [address] [facility]      Dr. Trent Wills  [address] [place] [address] [phone]    MD Joseluis Stone Jr, II, MD  [facility]  [phone] ([facility])  [phone] ([facility])    [facility] ([place])  [address] [facility]  [address]  [place], [place] [address]  Phone: [phone]

## 2024-12-18 NOTE — PROGRESS NOTES
Patient is here today for ear pain.  States left is worse than the right.  Symptoms been going on for a couple of weeks.  No other symptoms. Had a bad cold about a month ago and started after that.    Vitals:    12/18/24 1700   BP: (!) 142/80   Site: Left Upper Arm   Position: Sitting   Cuff Size: Large Adult   Pulse: 62   Temp: 98.2 °F (36.8 °C)   TempSrc: Infrared   SpO2: 98%   Weight: 106.4 kg (234 lb 9.6 oz)     Wt Readings from Last 3 Encounters:   12/18/24 106.4 kg (234 lb 9.6 oz)   04/05/24 106.1 kg (234 lb)   03/23/23 103 kg (227 lb)     Body mass index is 33.66 kg/m².      4/2/2024     1:14 PM 2/27/2023     3:50 PM 11/25/2022     8:06 AM 1/29/2021     8:59 AM 10/23/2020     8:29 AM 11/1/2019     1:41 PM 9/1/2018     8:04 AM   PHQ Scores   PHQ2 Score 0 0 0 0 0 0 0   PHQ9 Score 0 0 0 0 0 0 0       Interpretation of Total Score Depression Severity: 1-4 = Minimal depression, 5-9 = Mild depression, 10-14 = Moderate depression, 15-19 = Moderately severe depression, 20-27 = Severe depression       GEN: Alert and oriented x 4 NAD, affect appropriate and overweight, well hydrated, well developed.  Right TM and canal clear  Left TM with thick clear fluid behind drum with not redness         ASSESSMENT AND PLAN:       Baldemar \"Vitor\" was seen today for ear pain.    Diagnoses and all orders for this visit:    Acute serous otitis media of left ear, recurrence not specified    -     predniSONE (DELTASONE) 20 MG tablet; Take 2 tablets by mouth daily for 5 days  -     triamcinolone (NASACORT ALLERGY 24HR) 55 MCG/ACT nasal inhaler; 2 sprays by Nasal route daily Both nostrils      If not improving in few weeks see ENT, names given        Portions of Note per  Alison Johnson CMA AAMA with corrections and edits per Carlene Mohamud MD.  I agree with entirety of note and was present and performed history and physical.  I also confirm that the note above accurately reflects all work, treatment, procedures, and medical decision

## 2025-02-04 RX ORDER — LEVOTHYROXINE SODIUM 200 UG/1
200 TABLET ORAL DAILY
Qty: 90 TABLET | Refills: 2 | Status: SHIPPED | OUTPATIENT
Start: 2025-02-04

## 2025-02-04 NOTE — TELEPHONE ENCOUNTER
Medication:   Requested Prescriptions     Pending Prescriptions Disp Refills    levothyroxine (SYNTHROID) 200 MCG tablet [Pharmacy Med Name: Levothyroxine Sodium 200mcg Tablet] 90 tablet 2     Sig: Take 1 tablet by mouth daily.          Patient Phone Number: [phone] (home) [phone] (work)    Last appt: 12/18/2024   Next appt: 4/11/2025    Last OARRS:        No data to display              PDMP Monitoring:    Last PDMP [place] as Reviewed ([place]):  Review User Review Instant Review Result          Preferred Pharmacy:   [facility] [address] [facility] [address] [place] [address] -  [phone] - F [phone]  [address] [place], [place] [id number]  Phone: [phone] Fax: [phone]    [facility] [id number] [place], [place] - [address] - P [phone] - F [phone]  Critical access hospital[address]  [place] [id number]  Phone: [phone] Fax: [phone]    [facility] - [place], [place] - [address] [facility] [address] - P [phone] - F [phone]  [address] [facility] [address]  [facility] [address]  Phone: [phone] Fax: [phone]

## 2025-02-05 RX ORDER — LISINOPRIL 20 MG/1
20 TABLET ORAL DAILY
Qty: 90 TABLET | Refills: 2 | Status: SHIPPED | OUTPATIENT
Start: 2025-02-05

## 2025-02-05 NOTE — TELEPHONE ENCOUNTER
Medication:   Requested Prescriptions     Pending Prescriptions Disp Refills    lisinopril (PRINIVIL;ZESTRIL) 20 MG tablet [Pharmacy Med Name: Lisinopril 20mg Tablet] 90 tablet 2     Sig: Take 1 tablet by mouth daily.       Patient Phone Number: [phone] (home) [phone] (work)    Last appt: 12/18/2024   Next appt: 4/11/2025    Last OARRS:        No data to display              PDMP Monitoring:    Last PDMP [place] as Reviewed ([place]):  Review User Review Instant Review Result          Preferred Pharmacy:   [facility] [facility] [address] [place] [address] [phone] - F [phone]  [address] [place], [place] [id number]  Phone: [phone] Fax: [phone]    [facility] [id number] [place], [place] - [address]  - P [phone] - F [phone]  [facility] [address]  [place] [address]  Phone: [phone] Fax: [phone]    [facility] - [place], [place] - [address] [facility] [address] - P [phone] - F [phone]  [address] [facility] [address]  [facility] [address]  Phone: [phone] Fax: [phone]

## 2025-04-10 ENCOUNTER — TELEPHONE (OUTPATIENT)
Dept: FAMILY MEDICINE CLINIC | Age: 70
End: 2025-04-10

## 2025-04-10 ASSESSMENT — PATIENT HEALTH QUESTIONNAIRE - PHQ9
SUM OF ALL RESPONSES TO PHQ QUESTIONS 1-9: 0
1. LITTLE INTEREST OR PLEASURE IN DOING THINGS: NOT AT ALL
SUM OF ALL RESPONSES TO PHQ9 QUESTIONS 1 & 2: 0
1. LITTLE INTEREST OR PLEASURE IN DOING THINGS: NOT AT ALL
2. FEELING DOWN, DEPRESSED OR HOPELESS: NOT AT ALL
SUM OF ALL RESPONSES TO PHQ QUESTIONS 1-9: 0
2. FEELING DOWN, DEPRESSED OR HOPELESS: NOT AT ALL

## 2025-04-10 NOTE — TELEPHONE ENCOUNTER
Patient has physical scheduled for 4/16/25 and is requesting labs to be entered and faxed to [facility]. Fax number is [phone].      Please advise.

## 2025-04-12 LAB
ALBUMIN: 4.2 G/DL
ALBUMIN: 4.2 G/DL (ref 3.8–4.8)
ALP BLD-CCNC: 127 IU/L (ref 44–121)
ALP BLD-CCNC: 127 U/L (ref 44–121)
ALT SERPL-CCNC: 24 IU/L (ref 0–44)
ALT SERPL-CCNC: 24 U/L
ANION GAP SERPL CALCULATED.3IONS-SCNC: ABNORMAL MMOL/L
AST SERPL-CCNC: 22 IU/L (ref 0–40)
AST SERPL-CCNC: 22 U/L
BILIRUB SERPL-MCNC: 0.6 MG/DL
BILIRUB SERPL-MCNC: 0.6 MG/DL (ref 0–1.2)
BUN / CREAT RATIO: 8 (ref 10–24)
BUN BLDV-MCNC: 9 MG/DL
BUN BLDV-MCNC: 9 MG/DL (ref 8–27)
CALCIUM SERPL-MCNC: 9.1 MG/DL
CALCIUM SERPL-MCNC: 9.1 MG/DL (ref 8.6–10.2)
CHLORIDE BLD-SCNC: 104 MMOL/L
CHLORIDE BLD-SCNC: 104 MMOL/L (ref 96–106)
CHOLESTEROL, TOTAL: 163 MG/DL
CHOLESTEROL, TOTAL: 163 MG/DL (ref 100–199)
CHOLESTEROL/HDL RATIO: NORMAL
CO2: 24 MMOL/L
CO2: 24 MMOL/L (ref 20–29)
CREAT SERPL-MCNC: 1.14 MG/DL
CREAT SERPL-MCNC: 1.14 MG/DL (ref 0.76–1.27)
ESTIMATED GLOMERULAR FILTRATION RATE CREATININE EQUATION: 69 ML/MIN/1.73
GFR, ESTIMATED: 69
GLOBULIN: 2.3 G/DL (ref 1.5–4.5)
GLUCOSE BLD-MCNC: 84 MG/DL
GLUCOSE BLD-MCNC: 84 MG/DL (ref 70–99)
HDLC SERPL-MCNC: 54 MG/DL
HDLC SERPL-MCNC: 54 MG/DL
LDL CHOLESTEROL: 92
LDL CHOLESTEROL: 92 MG/DL (ref 0–99)
NONHDLC SERPL-MCNC: NORMAL MG/DL
POTASSIUM SERPL-SCNC: 4.6 MMOL/L
POTASSIUM SERPL-SCNC: 4.6 MMOL/L (ref 3.5–5.2)
SODIUM BLD-SCNC: 140 MMOL/L
SODIUM BLD-SCNC: 140 MMOL/L (ref 134–144)
TOTAL PROTEIN: 6.5 G/DL
TOTAL PROTEIN: 6.5 G/DL (ref 6–8.5)
TRIGL SERPL-MCNC: 90 MG/DL
TRIGL SERPL-MCNC: 90 MG/DL (ref 0–149)
TSH SERPL DL<=0.05 MIU/L-ACNC: 1.08 UIU/ML
TSH SERPL DL<=0.05 MIU/L-ACNC: 1.08 UIU/ML (ref 0.45–4.5)
VLDLC SERPL CALC-MCNC: 17 MG/DL
VLDLC SERPL CALC-MCNC: 17 MG/DL (ref 5–40)

## 2025-04-15 NOTE — PROGRESS NOTES
Here for annual physical.  Wants to discuss his BP, is running a little high, is wanting to maybe get BP medication increased    Dental: up-to-date  Eye: up-to-date    Colonoscopy: up-to-date    Seatbelt: Always    Exercise:   didn't say anything, he said that he will be exercising every day once it gets warm.  Said he doesn't exercise in the winter  Do you eat balanced/healthy meals regularly?  So so      Living Will and/or Healthcare POA: No,   Additional information provided        4/10/2025     6:18 AM 4/2/2024     1:14 PM 2/27/2023     3:50 PM 11/25/2022     8:06 AM 1/29/2021     8:59 AM 10/23/2020     8:29 AM 11/1/2019     1:41 PM   PHQ Scores   PHQ2 Score 0  0 0 0 0 0 0   PHQ9 Score 0  0 0 0 0 0 0       Patient-reported        Baldemar Lord Sr. is a 69 y.o. male who presents for follow-up of HTN.    Checks BP at home: irregularly  BP numbers: has check twice recently 140-150's but was ok at [facility]  Taking medication daily: Yes (lisinopril)  Side Effects of medication: no      Taking levothyroxine daily, no SE          HM reviewed with pt    Patient's medications, allergies, past medical, surgical, social and family histories were reviewed and updated in the EHR as appropriate.    Vitals:    04/16/25 1600   BP: 128/80   BP Site: Left Upper Arm   Patient Position: Sitting   BP Cuff Size: Large Adult   Pulse: 67   Temp: 97.2 °F (36.2 °C)   TempSrc: Infrared   SpO2: 99%   Weight: 108 kg (238 lb)   Height: 1.778 m (5' 10\")     Wt Readings from Last 3 Encounters:   04/16/25 108 kg (238 lb)   12/18/24 106.4 kg (234 lb 9.6 oz)   04/05/24 106.1 kg (234 lb)     Body mass index is 34.15 kg/m².      GENERAL Alert and oriented x 4 NAD, affect appropriate and obese, well hydrated, well developed.  NECK:supple and non tender without mass, no thyromegaly or thyroid nodules, no cervical lymphadenopathy  HEENT: TM clear bilaterally  LUNG:clear to auscultation bilaterally with normal respiratory effort  CV: Normal

## 2025-04-16 ENCOUNTER — RESULTS FOLLOW-UP (OUTPATIENT)
Dept: FAMILY MEDICINE CLINIC | Age: 70
End: 2025-04-16

## 2025-04-16 ENCOUNTER — OFFICE VISIT (OUTPATIENT)
Dept: FAMILY MEDICINE CLINIC | Age: 70
End: 2025-04-16
Payer: COMMERCIAL

## 2025-04-16 VITALS
WEIGHT: 238 LBS | BODY MASS INDEX: 34.07 KG/M2 | DIASTOLIC BLOOD PRESSURE: 80 MMHG | TEMPERATURE: 97.2 F | HEIGHT: 70 IN | OXYGEN SATURATION: 99 % | HEART RATE: 67 BPM | SYSTOLIC BLOOD PRESSURE: 128 MMHG

## 2025-04-16 DIAGNOSIS — Z00.00 WELL ADULT EXAM: Primary | ICD-10-CM

## 2025-04-16 DIAGNOSIS — E03.9 ACQUIRED HYPOTHYROIDISM: ICD-10-CM

## 2025-04-16 DIAGNOSIS — I10 ESSENTIAL HYPERTENSION: ICD-10-CM

## 2025-04-16 PROCEDURE — 3079F DIAST BP 80-89 MM HG: CPT | Performed by: FAMILY MEDICINE

## 2025-04-16 PROCEDURE — 3074F SYST BP LT 130 MM HG: CPT | Performed by: FAMILY MEDICINE

## 2025-04-16 PROCEDURE — 99214 OFFICE O/P EST MOD 30 MIN: CPT | Performed by: FAMILY MEDICINE

## 2025-04-16 PROCEDURE — 99397 PER PM REEVAL EST PAT 65+ YR: CPT | Performed by: FAMILY MEDICINE

## 2025-04-16 RX ORDER — LEVOTHYROXINE SODIUM 200 UG/1
200 TABLET ORAL DAILY
Qty: 90 TABLET | Refills: 3 | Status: SHIPPED | OUTPATIENT
Start: 2025-04-16

## 2025-04-16 RX ORDER — LISINOPRIL 20 MG/1
20 TABLET ORAL DAILY
Qty: 90 TABLET | Refills: 3 | Status: SHIPPED | OUTPATIENT
Start: 2025-04-16

## 2025-04-16 SDOH — ECONOMIC STABILITY: FOOD INSECURITY: WITHIN THE PAST 12 MONTHS, THE FOOD YOU BOUGHT JUST DIDN'T LAST AND YOU DIDN'T HAVE MONEY TO GET MORE.: NEVER TRUE

## 2025-04-16 SDOH — ECONOMIC STABILITY: FOOD INSECURITY: WITHIN THE PAST 12 MONTHS, YOU WORRIED THAT YOUR FOOD WOULD RUN OUT BEFORE YOU GOT MONEY TO BUY MORE.: NEVER TRUE

## 2025-04-16 NOTE — PATIENT INSTRUCTIONS
--Bring in copy of living will and healthcare power of  for your chart.      --Get your Covid vaccine this fall. If you recently had Covid you can wait 3-4 months before getting it.      --Make sure you get your flu shot this fall. If you are over 65 look for the \"high dose\" flu shot for better protection.       Monitor blood pressure 2-3 times a week and drop off readings for review if high    Goal BP is <130/80 for many people, good control is <120/80    Need a new cuff? Check this website to make sure your BP cuff has been validated for accuracy:    www.validatebp.org

## 2025-05-27 ENCOUNTER — OFFICE VISIT (OUTPATIENT)
Dept: FAMILY MEDICINE CLINIC | Age: 70
End: 2025-05-27

## 2025-05-27 VITALS
HEART RATE: 67 BPM | DIASTOLIC BLOOD PRESSURE: 80 MMHG | OXYGEN SATURATION: 98 % | WEIGHT: 239.6 LBS | SYSTOLIC BLOOD PRESSURE: 130 MMHG | TEMPERATURE: 97.4 F | BODY MASS INDEX: 34.38 KG/M2

## 2025-05-27 DIAGNOSIS — M25.541 PAIN IN THUMB JOINT WITH MOVEMENT OF RIGHT HAND: Primary | ICD-10-CM

## 2025-05-27 NOTE — PROGRESS NOTES
Patient is here today due to right hand pain.  States it is like a stinging/burning for a couple of days.  No known injury.  Hasn't tried anything for this.  No weakness, or swelling, but it will hurt if he tries to grab anything. States was around base of thumb.  Avoided using and has seemed to help. Didn't take meds or rub creams into it. Would hurt when tried to use it. Was a burning sensation and sometimes electric sensation.  States no new or unusual activity. Pt is right hand dominant.  States now he made the appt it has imrpoved.    Vitals:    05/27/25 1426   BP: 130/80   BP Site: Left Upper Arm   Patient Position: Sitting   BP Cuff Size: Large Adult   Pulse: 67   Temp: 97.4 °F (36.3 °C)   TempSrc: Infrared   SpO2: 98%   Weight: 108.7 kg (239 lb 9.6 oz)     Wt Readings from Last 3 Encounters:   05/27/25 108.7 kg (239 lb 9.6 oz)   04/16/25 108 kg (238 lb)   12/18/24 106.4 kg (234 lb 9.6 oz)     Body mass index is 34.38 kg/m².      4/10/2025     6:18 AM 4/2/2024     1:14 PM 2/27/2023     3:50 PM 11/25/2022     8:06 AM 1/29/2021     8:59 AM 10/23/2020     8:29 AM 11/1/2019     1:41 PM   PHQ Scores   PHQ2 Score 0  0 0 0 0 0 0   PHQ9 Score 0  0 0 0 0 0 0       Patient-reported       Interpretation of Total Score Depression Severity: 1-4 = Minimal depression, 5-9 = Mild depression, 10-14 = Moderate depression, 15-19 = Moderately severe depression, 20-27 = Severe depression       GEN: Alert and oriented x 4 NAD, affect appropriate and obese, well hydrated, well developed.  Pain with grinding motion right 1st CMC joint  No swelling or redness of joint      ASSESSMENT AND PLAN:       Baldemar \"Vitor\" was seen today for hand pain.    Diagnoses and all orders for this visit:    Pain in thumb joint with movement of right hand    Likely some OA  Sx tx, monitor as has now improved        Portions of Note per  Alison Johnson CMA AAMA with corrections and edits per Carlene Mohamud MD.  I agree with entirety of note and was

## 2025-08-06 ENCOUNTER — TELEPHONE (OUTPATIENT)
Dept: FAMILY MEDICINE CLINIC | Age: 70
End: 2025-08-06

## 2025-08-07 RX ORDER — CELECOXIB 200 MG/1
200 CAPSULE ORAL DAILY PRN
Qty: 30 CAPSULE | Refills: 3 | Status: SHIPPED | OUTPATIENT
Start: 2025-08-07

## (undated) DEVICE — SYRINGE TB 1ML NDL 25GA L0.625IN PLAS SLIP TIP CONVENTIONAL

## (undated) DEVICE — Device

## (undated) DEVICE — GLOVE SURG SZ 65 L12IN FNGR THK87MIL WHT LTX FREE

## (undated) DEVICE — Device: Brand: MEDEX

## (undated) DEVICE — 3 ML SYRINGE LUER-LOCK TIP: Brand: MONOJECT

## (undated) DEVICE — OPHTHALMIC KNIFE 15°: Brand: ALCON

## (undated) DEVICE — KAHOOK DUAL BLADE: Brand: KAHOOK DUAL BLADE

## (undated) DEVICE — MICROSURGICAL INSTRUMENT ANTERIOR CHAMBER CANNULA 30GA: Brand: ALCON

## (undated) DEVICE — WIPE INSTR W73XL73CM VISITEC

## (undated) DEVICE — SOLUTION IV IRRIG WATER 500ML POUR BRL ST 2F7113

## (undated) DEVICE — SYRINGE, LUER LOCK, 30ML: Brand: MEDLINE

## (undated) DEVICE — SURGICAL PROC PACK SHT WEST V4

## (undated) DEVICE — HANDLE CLIP ON MICROSCOPE

## (undated) DEVICE — Device: Brand: MALYUGIN RING SYSTEM 7.0MM

## (undated) DEVICE — MARKER,SKIN,WI/RULER AND LABELS: Brand: MEDLINE

## (undated) DEVICE — SOLUTION IRRIG BSS ST 500ML